# Patient Record
Sex: MALE | Race: WHITE | NOT HISPANIC OR LATINO | Employment: UNEMPLOYED | URBAN - METROPOLITAN AREA
[De-identification: names, ages, dates, MRNs, and addresses within clinical notes are randomized per-mention and may not be internally consistent; named-entity substitution may affect disease eponyms.]

---

## 2023-02-20 PROBLEM — R26.2 DIFFICULTY WALKING: Status: ACTIVE | Noted: 2023-02-20

## 2023-02-20 PROBLEM — K21.9 GERD WITHOUT ESOPHAGITIS: Status: ACTIVE | Noted: 2023-02-20

## 2023-02-20 PROBLEM — J18.9 PNEUMONIA: Status: ACTIVE | Noted: 2023-02-20

## 2023-02-20 PROBLEM — K76.0 HEPATIC STEATOSIS: Status: ACTIVE | Noted: 2023-02-20

## 2023-02-20 PROBLEM — I25.10 ARTERIOSCLEROTIC CORONARY ARTERY DISEASE: Status: ACTIVE | Noted: 2023-02-20

## 2023-02-20 PROBLEM — N28.89 RIGHT RENAL MASS: Status: ACTIVE | Noted: 2023-02-20

## 2023-02-20 PROBLEM — S41.119A SKIN TEAR OF UPPER EXTREMITY: Status: ACTIVE | Noted: 2023-02-20

## 2023-02-20 PROBLEM — H93.13 TINNITUS, BILATERAL: Status: ACTIVE | Noted: 2023-02-20

## 2023-02-20 PROBLEM — I50.30 (HFPEF) HEART FAILURE WITH PRESERVED EJECTION FRACTION (MULTI): Status: ACTIVE | Noted: 2023-02-20

## 2023-02-20 PROBLEM — K92.2 GI BLEED: Status: ACTIVE | Noted: 2023-02-20

## 2023-02-20 PROBLEM — J34.89 RHINORRHEA: Status: ACTIVE | Noted: 2023-02-20

## 2023-02-20 PROBLEM — R60.9 EDEMA: Status: ACTIVE | Noted: 2023-02-20

## 2023-02-20 PROBLEM — E03.2 HYPOTHYROIDISM DUE TO MEDICATION: Status: ACTIVE | Noted: 2023-02-20

## 2023-02-20 PROBLEM — K62.89 RECTAL PAIN: Status: ACTIVE | Noted: 2023-02-20

## 2023-02-20 PROBLEM — S80.10XA LEG HEMATOMA: Status: ACTIVE | Noted: 2023-02-20

## 2023-02-20 PROBLEM — E78.00 HYPERCHOLESTEROLEMIA: Status: ACTIVE | Noted: 2023-02-20

## 2023-02-20 PROBLEM — S22.39XA RIB FRACTURE: Status: ACTIVE | Noted: 2023-02-20

## 2023-02-20 PROBLEM — R26.81 UNSTEADINESS ON FEET: Status: ACTIVE | Noted: 2023-02-20

## 2023-02-20 PROBLEM — R26.89 BALANCE PROBLEM: Status: ACTIVE | Noted: 2023-02-20

## 2023-02-20 PROBLEM — G47.00 INSOMNIA: Status: ACTIVE | Noted: 2023-02-20

## 2023-02-20 PROBLEM — E11.9 DIABETES MELLITUS (MULTI): Status: ACTIVE | Noted: 2023-02-20

## 2023-02-20 PROBLEM — R06.02 SHORTNESS OF BREATH: Status: ACTIVE | Noted: 2023-02-20

## 2023-02-20 PROBLEM — T38.0X5A STEROID MYOPATHY: Status: ACTIVE | Noted: 2023-02-20

## 2023-02-20 PROBLEM — R79.89 ABNORMAL LFTS: Status: ACTIVE | Noted: 2023-02-20

## 2023-02-20 PROBLEM — Z04.9 CONDITION NOT FOUND: Status: ACTIVE | Noted: 2023-02-20

## 2023-02-20 PROBLEM — M65.88 RS3PE SYNDROME (REMITTING SERONEGATIVE SYMMETRICAL SYNOVITIS WITH PITTING EDEMA): Status: ACTIVE | Noted: 2023-02-20

## 2023-02-20 PROBLEM — R53.1 WEAKNESS: Status: ACTIVE | Noted: 2023-02-20

## 2023-02-20 PROBLEM — R93.89 ABNORMAL CT SCAN, CHEST: Status: ACTIVE | Noted: 2023-02-20

## 2023-02-20 PROBLEM — I48.91 ATRIAL FIBRILLATION (MULTI): Status: ACTIVE | Noted: 2023-02-20

## 2023-02-20 PROBLEM — R50.9 FEVER: Status: ACTIVE | Noted: 2023-02-20

## 2023-02-20 PROBLEM — M25.559 HIP PAIN: Status: ACTIVE | Noted: 2023-02-20

## 2023-02-20 PROBLEM — T14.8XXA NONHEALING NONSURGICAL WOUND: Status: ACTIVE | Noted: 2023-02-20

## 2023-02-20 PROBLEM — E87.6 HYPOKALEMIA: Status: ACTIVE | Noted: 2023-02-20

## 2023-02-20 PROBLEM — R10.9 ABDOMINAL CRAMPING: Status: ACTIVE | Noted: 2023-02-20

## 2023-02-20 PROBLEM — G62.9 PERIPHERAL NEUROPATHY: Status: ACTIVE | Noted: 2023-02-20

## 2023-02-20 PROBLEM — I10 HYPERTENSION: Status: ACTIVE | Noted: 2023-02-20

## 2023-02-20 PROBLEM — R60.9 RS3PE SYNDROME (REMITTING SERONEGATIVE SYMMETRICAL SYNOVITIS WITH PITTING EDEMA): Status: ACTIVE | Noted: 2023-02-20

## 2023-02-20 PROBLEM — D64.9 ANEMIA: Status: ACTIVE | Noted: 2023-02-20

## 2023-02-20 PROBLEM — C34.90 LUNG CANCER (MULTI): Status: ACTIVE | Noted: 2023-02-20

## 2023-02-20 PROBLEM — R60.9 PERIPHERAL EDEMA: Status: ACTIVE | Noted: 2023-02-20

## 2023-02-20 PROBLEM — F32.A DEPRESSION: Status: ACTIVE | Noted: 2023-02-20

## 2023-02-20 PROBLEM — S81.011D KNEE LACERATION, RIGHT, SUBSEQUENT ENCOUNTER: Status: ACTIVE | Noted: 2023-02-20

## 2023-02-20 PROBLEM — G56.00 CARPAL TUNNEL SYNDROME: Status: ACTIVE | Noted: 2023-02-20

## 2023-02-20 PROBLEM — N39.0 UTI (URINARY TRACT INFECTION): Status: ACTIVE | Noted: 2023-02-20

## 2023-02-20 PROBLEM — N13.30 HYDRONEPHROSIS, LEFT: Status: ACTIVE | Noted: 2023-02-20

## 2023-02-20 PROBLEM — G72.0 STEROID MYOPATHY: Status: ACTIVE | Noted: 2023-02-20

## 2023-02-20 PROBLEM — S89.90XA KNEE INJURIES: Status: ACTIVE | Noted: 2023-02-20

## 2023-02-20 PROBLEM — I95.9 HYPOTENSION: Status: ACTIVE | Noted: 2023-02-20

## 2023-02-20 PROBLEM — D72.829 ELEVATED WHITE BLOOD CELL COUNT: Status: ACTIVE | Noted: 2023-02-20

## 2023-02-20 PROBLEM — S81.802A OPEN LEG WOUND, LEFT, INITIAL ENCOUNTER: Status: ACTIVE | Noted: 2023-02-20

## 2023-02-20 PROBLEM — M48.061 LUMBAR SPINAL STENOSIS: Status: ACTIVE | Noted: 2023-02-20

## 2023-02-20 RX ORDER — ESOMEPRAZOLE MAGNESIUM 40 MG/1
1 CAPSULE, DELAYED RELEASE ORAL 2 TIMES DAILY
COMMUNITY
Start: 2021-12-09 | End: 2023-04-19 | Stop reason: SDUPTHER

## 2023-02-20 RX ORDER — ACETAMINOPHEN 160 MG/5ML
2 SUSPENSION, ORAL (FINAL DOSE FORM) ORAL DAILY
COMMUNITY
Start: 2017-02-01

## 2023-02-20 RX ORDER — IPRATROPIUM BROMIDE 42 UG/1
2 SPRAY, METERED NASAL 4 TIMES DAILY PRN
COMMUNITY
Start: 2020-12-01 | End: 2023-09-13 | Stop reason: SDUPTHER

## 2023-02-20 RX ORDER — ATORVASTATIN CALCIUM 40 MG/1
1 TABLET, FILM COATED ORAL DAILY
COMMUNITY
Start: 2020-12-01

## 2023-02-20 RX ORDER — AMIODARONE HYDROCHLORIDE 100 MG/1
1 TABLET ORAL DAILY
COMMUNITY
Start: 2021-07-22 | End: 2023-08-01 | Stop reason: ALTCHOICE

## 2023-02-20 RX ORDER — DAPAGLIFLOZIN 10 MG/1
5 TABLET, FILM COATED ORAL DAILY
COMMUNITY

## 2023-02-20 RX ORDER — LEVOTHYROXINE SODIUM 75 UG/1
1 TABLET ORAL DAILY
COMMUNITY
Start: 2021-07-24

## 2023-02-20 RX ORDER — CHOLECALCIFEROL (VITAMIN D3) 50 MCG
2 TABLET ORAL DAILY
COMMUNITY
Start: 2020-07-29 | End: 2023-12-01 | Stop reason: SDUPTHER

## 2023-02-20 RX ORDER — FUROSEMIDE 40 MG/1
40 TABLET ORAL 2 TIMES DAILY
COMMUNITY
End: 2023-03-08 | Stop reason: SDUPTHER

## 2023-02-20 RX ORDER — DOXYCYCLINE 100 MG/1
100 TABLET ORAL EVERY 12 HOURS
COMMUNITY
End: 2023-04-19 | Stop reason: ALTCHOICE

## 2023-02-20 RX ORDER — POTASSIUM CHLORIDE 750 MG/1
1 TABLET, FILM COATED, EXTENDED RELEASE ORAL DAILY
COMMUNITY
Start: 2021-06-24 | End: 2023-03-08

## 2023-02-20 RX ORDER — PREDNISONE 1 MG/1
2 TABLET ORAL DAILY
COMMUNITY
Start: 2020-10-17 | End: 2024-01-03 | Stop reason: SDUPTHER

## 2023-02-20 RX ORDER — NITROFURANTOIN 25; 75 MG/1; MG/1
100 CAPSULE ORAL EVERY 12 HOURS SCHEDULED
COMMUNITY
End: 2023-08-01 | Stop reason: ALTCHOICE

## 2023-03-07 DIAGNOSIS — Z00.00 HEALTHCARE MAINTENANCE: ICD-10-CM

## 2023-03-07 DIAGNOSIS — E87.6 HYPOKALEMIA: ICD-10-CM

## 2023-03-08 RX ORDER — FUROSEMIDE 40 MG/1
40 TABLET ORAL 2 TIMES DAILY
Qty: 30 TABLET | Refills: 2 | Status: SHIPPED | OUTPATIENT
Start: 2023-03-08 | End: 2023-08-01 | Stop reason: SDUPTHER

## 2023-03-08 RX ORDER — POTASSIUM CHLORIDE 750 MG/1
TABLET, EXTENDED RELEASE ORAL
Qty: 30 TABLET | Refills: 0 | Status: SHIPPED | OUTPATIENT
Start: 2023-03-08 | End: 2023-04-04 | Stop reason: SDUPTHER

## 2023-03-09 LAB
ALBUMIN (G/DL) IN SER/PLAS: 4 G/DL (ref 3.4–5)
ANION GAP IN SER/PLAS: 15 MMOL/L (ref 10–20)
CALCIUM (MG/DL) IN SER/PLAS: 9.1 MG/DL (ref 8.6–10.6)
CARBON DIOXIDE, TOTAL (MMOL/L) IN SER/PLAS: 29 MMOL/L (ref 21–32)
CHLORIDE (MMOL/L) IN SER/PLAS: 95 MMOL/L (ref 98–107)
CREATININE (MG/DL) IN SER/PLAS: 1.88 MG/DL (ref 0.5–1.3)
GFR MALE: 34 ML/MIN/1.73M2
GLUCOSE (MG/DL) IN SER/PLAS: 326 MG/DL (ref 74–99)
NATRIURETIC PEPTIDE B (PG/ML) IN SER/PLAS: 199 PG/ML (ref 0–99)
PHOSPHATE (MG/DL) IN SER/PLAS: 3.6 MG/DL (ref 2.5–4.9)
POTASSIUM (MMOL/L) IN SER/PLAS: 4.3 MMOL/L (ref 3.5–5.3)
SODIUM (MMOL/L) IN SER/PLAS: 135 MMOL/L (ref 136–145)
UREA NITROGEN (MG/DL) IN SER/PLAS: 39 MG/DL (ref 6–23)

## 2023-03-18 LAB
ANION GAP IN SER/PLAS: 14 MMOL/L (ref 10–20)
CALCIUM (MG/DL) IN SER/PLAS: 9.3 MG/DL (ref 8.6–10.6)
CARBON DIOXIDE, TOTAL (MMOL/L) IN SER/PLAS: 29 MMOL/L (ref 21–32)
CHLORIDE (MMOL/L) IN SER/PLAS: 101 MMOL/L (ref 98–107)
CREATININE (MG/DL) IN SER/PLAS: 1.65 MG/DL (ref 0.5–1.3)
ESTIMATED AVERAGE GLUCOSE FOR HBA1C: 237 MG/DL
GFR MALE: 40 ML/MIN/1.73M2
GLUCOSE (MG/DL) IN SER/PLAS: 222 MG/DL (ref 74–99)
HEMOGLOBIN A1C/HEMOGLOBIN TOTAL IN BLOOD: 9.9 %
NATRIURETIC PEPTIDE B (PG/ML) IN SER/PLAS: 236 PG/ML (ref 0–99)
POTASSIUM (MMOL/L) IN SER/PLAS: 4.4 MMOL/L (ref 3.5–5.3)
SODIUM (MMOL/L) IN SER/PLAS: 140 MMOL/L (ref 136–145)
UREA NITROGEN (MG/DL) IN SER/PLAS: 30 MG/DL (ref 6–23)

## 2023-04-04 DIAGNOSIS — E87.6 HYPOKALEMIA: ICD-10-CM

## 2023-04-04 RX ORDER — POTASSIUM CHLORIDE 750 MG/1
10 TABLET, FILM COATED, EXTENDED RELEASE ORAL DAILY
Qty: 30 TABLET | Refills: 1 | Status: SHIPPED | OUTPATIENT
Start: 2023-04-04 | End: 2023-08-01 | Stop reason: ALTCHOICE

## 2023-04-19 ENCOUNTER — OFFICE VISIT (OUTPATIENT)
Dept: PRIMARY CARE | Facility: CLINIC | Age: 88
End: 2023-04-19
Payer: MEDICARE

## 2023-04-19 VITALS
DIASTOLIC BLOOD PRESSURE: 84 MMHG | WEIGHT: 210 LBS | HEIGHT: 72 IN | SYSTOLIC BLOOD PRESSURE: 128 MMHG | BODY MASS INDEX: 28.44 KG/M2 | HEART RATE: 58 BPM

## 2023-04-19 DIAGNOSIS — E11.9 TYPE 2 DIABETES MELLITUS WITHOUT COMPLICATION, WITHOUT LONG-TERM CURRENT USE OF INSULIN (MULTI): Primary | ICD-10-CM

## 2023-04-19 DIAGNOSIS — C34.90 MALIGNANT NEOPLASM OF LUNG, UNSPECIFIED LATERALITY, UNSPECIFIED PART OF LUNG (MULTI): ICD-10-CM

## 2023-04-19 DIAGNOSIS — I10 PRIMARY HYPERTENSION: ICD-10-CM

## 2023-04-19 DIAGNOSIS — K21.9 GERD WITHOUT ESOPHAGITIS: ICD-10-CM

## 2023-04-19 DIAGNOSIS — L30.9 DERMATITIS: ICD-10-CM

## 2023-04-19 DIAGNOSIS — R60.9 PERIPHERAL EDEMA: ICD-10-CM

## 2023-04-19 PROBLEM — K92.2 GI BLEED: Status: RESOLVED | Noted: 2023-02-20 | Resolved: 2023-04-19

## 2023-04-19 PROCEDURE — 3074F SYST BP LT 130 MM HG: CPT | Performed by: INTERNAL MEDICINE

## 2023-04-19 PROCEDURE — 1159F MED LIST DOCD IN RCRD: CPT | Performed by: INTERNAL MEDICINE

## 2023-04-19 PROCEDURE — 3079F DIAST BP 80-89 MM HG: CPT | Performed by: INTERNAL MEDICINE

## 2023-04-19 PROCEDURE — 1036F TOBACCO NON-USER: CPT | Performed by: INTERNAL MEDICINE

## 2023-04-19 PROCEDURE — 99213 OFFICE O/P EST LOW 20 MIN: CPT | Performed by: INTERNAL MEDICINE

## 2023-04-19 PROCEDURE — 1160F RVW MEDS BY RX/DR IN RCRD: CPT | Performed by: INTERNAL MEDICINE

## 2023-04-19 RX ORDER — ESOMEPRAZOLE MAGNESIUM 40 MG/1
40 CAPSULE, DELAYED RELEASE ORAL
Qty: 90 CAPSULE | Refills: 3 | Status: SHIPPED | OUTPATIENT
Start: 2023-04-19

## 2023-04-19 RX ORDER — TRIAMCINOLONE ACETONIDE 1 MG/G
OINTMENT TOPICAL 2 TIMES DAILY PRN
Qty: 60 G | Refills: 3 | Status: SHIPPED | OUTPATIENT
Start: 2023-04-19 | End: 2023-08-17

## 2023-04-19 RX ORDER — GUAIFENESIN 600 MG/1
600 TABLET, EXTENDED RELEASE ORAL DAILY PRN
Qty: 90 TABLET | Refills: 3 | Status: SHIPPED | OUTPATIENT
Start: 2023-04-19 | End: 2024-01-03 | Stop reason: WASHOUT

## 2023-04-19 RX ORDER — GLIPIZIDE 2.5 MG/1
2.5 TABLET, EXTENDED RELEASE ORAL
Qty: 30 TABLET | Refills: 11 | COMMUNITY
Start: 2023-03-31 | End: 2023-08-01 | Stop reason: ALTCHOICE

## 2023-04-19 ASSESSMENT — ENCOUNTER SYMPTOMS
PALPITATIONS: 0
POLYDIPSIA: 0
FEVER: 0
SHORTNESS OF BREATH: 0
CHILLS: 0
COUGH: 0

## 2023-04-19 ASSESSMENT — PAIN SCALES - GENERAL: PAINLEVEL: 0-NO PAIN

## 2023-04-19 NOTE — PROGRESS NOTES
Subjective   Patient ID: Arnaldo Neal is a 88 y.o. male who presents for Follow-up.    88-year-old male with previously uncontrolled peripheral edema presents for follow-up onset edema.  In the interval of visits medications have been adjusted by cardiology and edema is dramatically lower.  Additionally he had an A1c checked after a single blood sugar came back over 300 which showed 9.9 and adjustments have been made to his medications by his endocrinologist.    Now on regular diuretic therapy and farxiga for edema and CHF. Much improved edema. 3+ to 1+.     A1C 9.9 recently. Significant changes already instituted by pt's established Endo. Now on glipizide 2.5mg daily and farxiga 5mg. Morning sugar  and PM now 120's.     Doing regular exercise with .     Hypoglycemia.  Does have orange juice in the house in case of event.  Follow-up with endocrinology currently scheduled by telemedicine coming up very soon.  No adjustments immediately required on evaluation today.         Review of Systems   Constitutional:  Negative for chills and fever.   Respiratory:  Negative for cough and shortness of breath.    Cardiovascular:  Negative for chest pain and palpitations.   Endocrine: Negative for polydipsia and polyuria.       Objective   /84 (BP Location: Left arm, Patient Position: Sitting, BP Cuff Size: Adult)   Pulse 58   Ht 1.829 m (6')   Wt 95.3 kg (210 lb)   BMI 28.48 kg/m²     Physical Exam  Constitutional:       Appearance: Normal appearance.   HENT:      Head: Normocephalic and atraumatic.   Eyes:      Extraocular Movements: Extraocular movements intact.      Pupils: Pupils are equal, round, and reactive to light.   Neck:      Thyroid: No thyroid mass or thyromegaly.      Vascular: No carotid bruit.   Cardiovascular:      Rate and Rhythm: Normal rate and regular rhythm.      Heart sounds: No murmur heard.     No friction rub. No gallop.   Pulmonary:      Effort: No respiratory distress.       Breath sounds: No wheezing, rhonchi or rales.   Musculoskeletal:      Cervical back: Neck supple.      Right lower leg: Edema (1+) present.      Left lower leg: Edema (1+) present.   Lymphadenopathy:      Cervical: No cervical adenopathy.   Neurological:      Mental Status: He is alert.         Assessment/Plan   Problem List Items Addressed This Visit          Respiratory    Lung cancer (CMS/HCC)    Relevant Medications    guaiFENesin (Mucinex) 600 mg 12 hr tablet       Circulatory    Hypertension       Digestive    GERD without esophagitis    Relevant Medications    esomeprazole (NexIUM) 40 mg DR capsule       Musculoskeletal    Peripheral edema       Endocrine/Metabolic    Diabetes mellitus (CMS/HCC) - Primary     Other Visit Diagnoses       Dermatitis        Relevant Medications    triamcinolone (Kenalog) 0.1 % ointment

## 2023-04-19 NOTE — PATIENT INSTRUCTIONS
3 mos follow up please    Continue current diuretic therapy without making adjustments.  Continue current diuretic therapy.  If sugars continue to be this controlled may want to consider stopping glipizide symptoms such a low dose and increasing the Farxiga to the full 10 mg daily but otherwise right now keep things the same await specialist input and follow-up as recommended.

## 2023-05-05 ENCOUNTER — TELEPHONE (OUTPATIENT)
Dept: PRIMARY CARE | Facility: CLINIC | Age: 88
End: 2023-05-05
Payer: COMMERCIAL

## 2023-05-05 NOTE — TELEPHONE ENCOUNTER
Patient has been feeling very sleepy, Narciso reilly said that things look good but the Pharmacist suggested looking at his thyroid levels. She would like to know if we knew his thyroid numbers from his previous labs and if it was added to the new lab request that was recently put in.

## 2023-05-15 LAB
ALBUMIN (G/DL) IN SER/PLAS: 3.6 G/DL (ref 3.4–5)
ANION GAP IN SER/PLAS: 13 MMOL/L (ref 10–20)
CALCIUM (MG/DL) IN SER/PLAS: 9.1 MG/DL (ref 8.6–10.6)
CARBON DIOXIDE, TOTAL (MMOL/L) IN SER/PLAS: 31 MMOL/L (ref 21–32)
CHLORIDE (MMOL/L) IN SER/PLAS: 101 MMOL/L (ref 98–107)
CREATININE (MG/DL) IN SER/PLAS: 1.78 MG/DL (ref 0.5–1.3)
GFR MALE: 36 ML/MIN/1.73M2
GLUCOSE (MG/DL) IN SER/PLAS: 177 MG/DL (ref 74–99)
NATRIURETIC PEPTIDE B (PG/ML) IN SER/PLAS: 305 PG/ML (ref 0–99)
PHOSPHATE (MG/DL) IN SER/PLAS: 3.9 MG/DL (ref 2.5–4.9)
POTASSIUM (MMOL/L) IN SER/PLAS: 5.1 MMOL/L (ref 3.5–5.3)
SODIUM (MMOL/L) IN SER/PLAS: 140 MMOL/L (ref 136–145)
UREA NITROGEN (MG/DL) IN SER/PLAS: 32 MG/DL (ref 6–23)

## 2023-07-13 LAB
ANION GAP IN SER/PLAS: 17 MMOL/L (ref 10–20)
CALCIUM (MG/DL) IN SER/PLAS: 9.8 MG/DL (ref 8.6–10.6)
CARBON DIOXIDE, TOTAL (MMOL/L) IN SER/PLAS: 28 MMOL/L (ref 21–32)
CHLORIDE (MMOL/L) IN SER/PLAS: 99 MMOL/L (ref 98–107)
CREATININE (MG/DL) IN SER/PLAS: 2.32 MG/DL (ref 0.5–1.3)
GFR MALE: 26 ML/MIN/1.73M2
GLUCOSE (MG/DL) IN SER/PLAS: 128 MG/DL (ref 74–99)
NATRIURETIC PEPTIDE B (PG/ML) IN SER/PLAS: 212 PG/ML (ref 0–99)
POTASSIUM (MMOL/L) IN SER/PLAS: 4.5 MMOL/L (ref 3.5–5.3)
SODIUM (MMOL/L) IN SER/PLAS: 139 MMOL/L (ref 136–145)
UREA NITROGEN (MG/DL) IN SER/PLAS: 36 MG/DL (ref 6–23)

## 2023-07-14 LAB
ESTIMATED AVERAGE GLUCOSE FOR HBA1C: 169 MG/DL
HEMOGLOBIN A1C/HEMOGLOBIN TOTAL IN BLOOD: 7.5 %

## 2023-07-28 LAB
ALBUMIN (G/DL) IN SER/PLAS: 3.8 G/DL (ref 3.4–5)
ANION GAP IN SER/PLAS: 16 MMOL/L (ref 10–20)
CALCIUM (MG/DL) IN SER/PLAS: 9.5 MG/DL (ref 8.6–10.6)
CARBON DIOXIDE, TOTAL (MMOL/L) IN SER/PLAS: 27 MMOL/L (ref 21–32)
CHLORIDE (MMOL/L) IN SER/PLAS: 101 MMOL/L (ref 98–107)
CREATININE (MG/DL) IN SER/PLAS: 1.89 MG/DL (ref 0.5–1.3)
GFR MALE: 34 ML/MIN/1.73M2
GLUCOSE (MG/DL) IN SER/PLAS: 162 MG/DL (ref 74–99)
NATRIURETIC PEPTIDE B (PG/ML) IN SER/PLAS: 302 PG/ML (ref 0–99)
PHOSPHATE (MG/DL) IN SER/PLAS: 4.2 MG/DL (ref 2.5–4.9)
POTASSIUM (MMOL/L) IN SER/PLAS: 5.1 MMOL/L (ref 3.5–5.3)
SODIUM (MMOL/L) IN SER/PLAS: 139 MMOL/L (ref 136–145)
UREA NITROGEN (MG/DL) IN SER/PLAS: 30 MG/DL (ref 6–23)

## 2023-08-01 ENCOUNTER — TELEPHONE (OUTPATIENT)
Dept: PRIMARY CARE | Facility: CLINIC | Age: 88
End: 2023-08-01

## 2023-08-01 ENCOUNTER — OFFICE VISIT (OUTPATIENT)
Dept: PRIMARY CARE | Facility: CLINIC | Age: 88
End: 2023-08-01
Payer: MEDICARE

## 2023-08-01 VITALS — TEMPERATURE: 97.3 F | SYSTOLIC BLOOD PRESSURE: 99 MMHG | HEART RATE: 53 BPM | DIASTOLIC BLOOD PRESSURE: 66 MMHG

## 2023-08-01 DIAGNOSIS — R26.89 BALANCE DISORDER: ICD-10-CM

## 2023-08-01 DIAGNOSIS — R26.81 GAIT INSTABILITY: Primary | ICD-10-CM

## 2023-08-01 DIAGNOSIS — Z00.00 HEALTHCARE MAINTENANCE: ICD-10-CM

## 2023-08-01 DIAGNOSIS — M62.81 MUSCULAR WEAKNESS: ICD-10-CM

## 2023-08-01 PROBLEM — A41.9 SEPSIS (MULTI): Status: ACTIVE | Noted: 2021-04-13

## 2023-08-01 PROBLEM — R53.81 DEBILITY: Status: ACTIVE | Noted: 2021-05-06

## 2023-08-01 PROCEDURE — 1036F TOBACCO NON-USER: CPT | Performed by: INTERNAL MEDICINE

## 2023-08-01 PROCEDURE — 3074F SYST BP LT 130 MM HG: CPT | Performed by: INTERNAL MEDICINE

## 2023-08-01 PROCEDURE — 1159F MED LIST DOCD IN RCRD: CPT | Performed by: INTERNAL MEDICINE

## 2023-08-01 PROCEDURE — 3078F DIAST BP <80 MM HG: CPT | Performed by: INTERNAL MEDICINE

## 2023-08-01 PROCEDURE — 1126F AMNT PAIN NOTED NONE PRSNT: CPT | Performed by: INTERNAL MEDICINE

## 2023-08-01 PROCEDURE — 1160F RVW MEDS BY RX/DR IN RCRD: CPT | Performed by: INTERNAL MEDICINE

## 2023-08-01 PROCEDURE — 99213 OFFICE O/P EST LOW 20 MIN: CPT | Performed by: INTERNAL MEDICINE

## 2023-08-01 RX ORDER — ALBUTEROL SULFATE 0.83 MG/ML
2.5 SOLUTION RESPIRATORY (INHALATION) 2 TIMES DAILY
COMMUNITY
Start: 2023-05-12 | End: 2024-01-08 | Stop reason: SDUPTHER

## 2023-08-01 RX ORDER — MOMETASONE FUROATE AND FORMOTEROL FUMARATE DIHYDRATE 100; 5 UG/1; UG/1
AEROSOL RESPIRATORY (INHALATION)
COMMUNITY

## 2023-08-01 RX ORDER — BLOOD-GLUCOSE METER
EACH MISCELLANEOUS
COMMUNITY
Start: 2023-07-09

## 2023-08-01 RX ORDER — FUROSEMIDE 40 MG/1
20 TABLET ORAL DAILY
Qty: 30 TABLET | Refills: 2
Start: 2023-08-01 | End: 2023-10-12 | Stop reason: DRUGHIGH

## 2023-08-01 RX ORDER — SPIRONOLACTONE 25 MG/1
12.5 TABLET ORAL DAILY
COMMUNITY
End: 2024-01-03 | Stop reason: WASHOUT

## 2023-08-01 RX ORDER — FUROSEMIDE 40 MG/1
40 TABLET ORAL DAILY
Qty: 30 TABLET | Refills: 2
Start: 2023-08-01 | End: 2023-08-01 | Stop reason: SDUPTHER

## 2023-08-01 RX ORDER — LANCETS 33 GAUGE
EACH MISCELLANEOUS
COMMUNITY
Start: 2023-07-09

## 2023-08-01 RX ORDER — INSULIN LISPRO 100 [IU]/ML
INJECTION, SOLUTION INTRAVENOUS; SUBCUTANEOUS
COMMUNITY
End: 2023-12-04 | Stop reason: ALTCHOICE

## 2023-08-01 RX ORDER — METOPROLOL SUCCINATE 50 MG/1
50 TABLET, EXTENDED RELEASE ORAL DAILY
COMMUNITY
End: 2023-10-27 | Stop reason: SDUPTHER

## 2023-08-01 ASSESSMENT — ENCOUNTER SYMPTOMS
PALPITATIONS: 0
CHILLS: 0
POLYDIPSIA: 0
FEVER: 0
COUGH: 0
SHORTNESS OF BREATH: 0

## 2023-08-01 NOTE — PATIENT INSTRUCTIONS
Please work to be careful with salt. Goal 2000mg total per day or less. Extra salt means extra swelling.

## 2023-08-01 NOTE — PROGRESS NOTES
Subjective   Patient ID: Arnaldo Neal is a 88 y.o. male who presents for Follow-up.    PT presents for med follow up. KAYLENE from prerenal azotemia 2/2 meds. Rx list updated.     PT counseled about inhaler use and med list. Demo provided.     Pt counseled about edema management, water intake, salt intake, and limits. Advised on compression stockings and leg elevation again.     A1c much improved through diet management in last 3 months. Back in 7's range again    Renal health more stable. Pt direct educated about med limits and risk for long term kidney damage if we need to raise meds long term due to no-adherance to diet/lifestyle measures.     Wheel chair bound. PT advised due to chronic weakness and fall risk.              Review of Systems   Constitutional:  Negative for chills and fever.   Respiratory:  Negative for cough and shortness of breath.    Cardiovascular:  Negative for chest pain and palpitations.   Endocrine: Negative for polydipsia and polyuria.       Objective   BP 99/66 (Patient Position: Sitting)   Pulse 53   Temp 36.3 °C (97.3 °F) (Temporal)     Physical Exam  Constitutional:       Appearance: Normal appearance.   HENT:      Head: Normocephalic and atraumatic.   Eyes:      Extraocular Movements: Extraocular movements intact.      Pupils: Pupils are equal, round, and reactive to light.   Neck:      Thyroid: No thyroid mass or thyromegaly.      Vascular: No carotid bruit.   Cardiovascular:      Rate and Rhythm: Normal rate and regular rhythm.      Heart sounds: No murmur heard.     No friction rub. No gallop.   Pulmonary:      Effort: No respiratory distress.      Breath sounds: No wheezing, rhonchi or rales.   Musculoskeletal:      Cervical back: Neck supple.      Right lower leg: Edema present.      Left lower leg: Edema present.   Lymphadenopathy:      Cervical: No cervical adenopathy.   Neurological:      Mental Status: He is alert.         Assessment/Plan   Problem List Items Addressed This  Visit    None  Visit Diagnoses       Gait instability    -  Primary    Relevant Orders    Referral to Physical Therapy    Healthcare maintenance        Relevant Medications    furosemide (Lasix) 40 mg tablet    Balance disorder        Relevant Orders    Referral to Physical Therapy    Muscular weakness        Relevant Orders    Referral to Physical Therapy

## 2023-09-01 ENCOUNTER — TELEPHONE (OUTPATIENT)
Dept: PRIMARY CARE | Facility: CLINIC | Age: 88
End: 2023-09-01
Payer: COMMERCIAL

## 2023-09-01 NOTE — TELEPHONE ENCOUNTER
Patient's wife states that Arnaldo needs  rx for home care she states that it has to [facility] on the prescription faxed over to [facility] [phone]

## 2023-09-06 DIAGNOSIS — R26.89 BALANCE PROBLEM: ICD-10-CM

## 2023-09-06 DIAGNOSIS — I50.32 CHRONIC HEART FAILURE WITH PRESERVED EJECTION FRACTION (MULTI): Primary | ICD-10-CM

## 2023-09-06 NOTE — TELEPHONE ENCOUNTER
Patient's wife states that Arnaldo needs  rx for home care she states that it has to [facility] on the prescription faxed over to [facility] [phone]

## 2023-09-07 PROBLEM — J47.9 BRONCHIECTASIS (MULTI): Status: ACTIVE | Noted: 2023-05-12

## 2023-09-07 PROBLEM — R74.8 ELEVATED ALKALINE PHOSPHATASE LEVEL: Status: ACTIVE | Noted: 2020-12-04

## 2023-09-07 PROBLEM — E44.1 MALNUTRITION OF MILD DEGREE (MULTI): Status: ACTIVE | Noted: 2020-09-28

## 2023-09-07 PROBLEM — E66.9 OBESITY, CLASS I, BMI 30-34.9: Status: ACTIVE | Noted: 2018-07-29

## 2023-09-07 PROBLEM — C18.9 COLON CANCER (MULTI): Status: ACTIVE | Noted: 2023-09-07

## 2023-09-07 PROBLEM — J44.9 COPD (CHRONIC OBSTRUCTIVE PULMONARY DISEASE) (MULTI): Status: ACTIVE | Noted: 2023-03-13

## 2023-09-07 PROBLEM — R31.9 HEMATURIA OF UNKNOWN CAUSE: Status: ACTIVE | Noted: 2021-06-07

## 2023-09-07 PROBLEM — E43 SEVERE PROTEIN-CALORIE MALNUTRITION (MULTI): Status: ACTIVE | Noted: 2021-03-27

## 2023-09-07 RX ORDER — ECONAZOLE NITRATE 10 MG/G
1 CREAM TOPICAL DAILY
COMMUNITY
End: 2023-12-04 | Stop reason: ALTCHOICE

## 2023-09-07 RX ORDER — FINASTERIDE 5 MG/1
1 TABLET, FILM COATED ORAL DAILY
COMMUNITY
End: 2023-12-04 | Stop reason: ALTCHOICE

## 2023-09-07 RX ORDER — AMIODARONE HYDROCHLORIDE 200 MG/1
1 TABLET ORAL DAILY
COMMUNITY
End: 2023-10-12 | Stop reason: ALTCHOICE

## 2023-09-07 RX ORDER — CLINDAMYCIN PHOSPHATE 10 MG/G
1 GEL TOPICAL
COMMUNITY
End: 2023-12-04 | Stop reason: ALTCHOICE

## 2023-09-07 RX ORDER — BACITRACIN ZINC 500 UNIT/G
2-3 OINTMENT (GRAM) TOPICAL DAILY PRN
COMMUNITY
End: 2023-12-04 | Stop reason: ALTCHOICE

## 2023-09-07 RX ORDER — GLIPIZIDE 2.5 MG/1
2.5 TABLET, EXTENDED RELEASE ORAL DAILY
COMMUNITY
End: 2023-10-12 | Stop reason: ALTCHOICE

## 2023-09-07 RX ORDER — METFORMIN HYDROCHLORIDE 500 MG/1
1 TABLET ORAL
COMMUNITY
End: 2023-10-12 | Stop reason: ALTCHOICE

## 2023-09-07 RX ORDER — DIGOXIN 125 MCG
1 TABLET ORAL DAILY
COMMUNITY
End: 2023-10-12 | Stop reason: ALTCHOICE

## 2023-09-07 RX ORDER — BACLOFEN 10 MG/1
1 TABLET ORAL 3 TIMES DAILY PRN
COMMUNITY
End: 2023-12-04 | Stop reason: ALTCHOICE

## 2023-09-07 RX ORDER — TRIAMCINOLONE ACETONIDE 1 MG/G
1 OINTMENT TOPICAL 2 TIMES DAILY PRN
COMMUNITY
End: 2024-01-08 | Stop reason: ALTCHOICE

## 2023-09-13 DIAGNOSIS — J34.89 RHINORRHEA: Primary | ICD-10-CM

## 2023-09-13 RX ORDER — IPRATROPIUM BROMIDE 42 UG/1
2 SPRAY, METERED NASAL 4 TIMES DAILY PRN
Qty: 15 ML | Refills: 3 | Status: SHIPPED | OUTPATIENT
Start: 2023-09-13 | End: 2023-11-06 | Stop reason: SDUPTHER

## 2023-10-05 ENCOUNTER — OFFICE VISIT (OUTPATIENT)
Dept: OTOLARYNGOLOGY | Facility: CLINIC | Age: 88
End: 2023-10-05
Payer: MEDICARE

## 2023-10-05 VITALS — HEIGHT: 72 IN | BODY MASS INDEX: 28.48 KG/M2 | TEMPERATURE: 97.6 F

## 2023-10-05 DIAGNOSIS — H61.23 BILATERAL IMPACTED CERUMEN: Primary | ICD-10-CM

## 2023-10-05 DIAGNOSIS — H73.12 CHRONIC MYRINGITIS OF LEFT EAR: ICD-10-CM

## 2023-10-05 PROCEDURE — 1126F AMNT PAIN NOTED NONE PRSNT: CPT | Performed by: NURSE PRACTITIONER

## 2023-10-05 PROCEDURE — 1036F TOBACCO NON-USER: CPT | Performed by: NURSE PRACTITIONER

## 2023-10-05 PROCEDURE — 69210 REMOVE IMPACTED EAR WAX UNI: CPT | Performed by: NURSE PRACTITIONER

## 2023-10-05 PROCEDURE — 99202 OFFICE O/P NEW SF 15 MIN: CPT | Performed by: NURSE PRACTITIONER

## 2023-10-05 PROCEDURE — 1160F RVW MEDS BY RX/DR IN RCRD: CPT | Performed by: NURSE PRACTITIONER

## 2023-10-05 PROCEDURE — 1159F MED LIST DOCD IN RCRD: CPT | Performed by: NURSE PRACTITIONER

## 2023-10-05 ASSESSMENT — PATIENT HEALTH QUESTIONNAIRE - PHQ9
2. FEELING DOWN, DEPRESSED OR HOPELESS: NOT AT ALL
SUM OF ALL RESPONSES TO PHQ9 QUESTIONS 1 AND 2: 0
1. LITTLE INTEREST OR PLEASURE IN DOING THINGS: NOT AT ALL

## 2023-10-05 NOTE — PATIENT INSTRUCTIONS
PATIENT INSTRUCTIONS FOR EAR WAX BUILDUP (A.K.A. CERUMEN)    To clean the ears, wash the external ear with a cloth, but do not insert anything into the ear canal.  Most cases of ear wax blockage respond to home treatments used to soften wax.   You may try placing a few drops of mineral oil or baby oil once a week to help keep the wax soft. We do NOT recommend placing ANYTHING in the ear canal. Doing so may cause wax to be pushed back into the ear canal and stuck. It also risks injury to the ear canal and ear drum. We recommend avoiding using cotton tipped applicators, tissues, paper, or any rigid object in the ear canal.   Some people require regular cleanings every year or so to keep the ear canals open.

## 2023-10-05 NOTE — PROGRESS NOTES
Patient ID: Arnaldo Neal is a 88 y.o. male.    ProceduresSubjective     Cerumen removal    Consent:  The planned procedure is discussed including possible risk, benefits and alternative treatments reviewed.  Verbal consent is obtained.    Indications:Obstructed cerumen is noted affecting hearing and causing discomfort.    Procedure: The ears are examined microscopically.  Using speculum, alligator, #7, #5 suction the obstructive cerumen in both the ears were removed.    Findings: Cerumen and epithelial debris obstruction in both external auditory canals.  Inspection of tympanic membrane after cleaning showed intact with no effusion, retraction or perforation. There is evidence of ear canal erythema and TM redness without effusion left ear.     Clotrimazole-betamethasone  cream applied to left ear.    Post procedure: The patient tolerated the procedure well without complications   Patient ID: Arnaldo Neal is a 88 y.o. male who presents for cerumen impaction.  HPI    Patient is accompanied by family member. Patient has no complaints of ear pain or drainage. 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 He states that he is able to hear ok. No prior history of ear surgery or trauma to the ears. No complaints of dizziness or vertigo. He is scheduled for hearing test  next week.     Review of Systems    EF ROS   All other systems have been reviewed and are negative for complaints except for those mentioned in history of present illness, past medical history and problem list              Objective   Physical Exam    CONSTITUTIONAL: No acute distress, normal facial features; No fever; no chills  VOICE: No hoarseness or other audible abnormality  RESPIRATION: Breathing comfortably, no stridor; normal breathing effort  CV: No cyanosis visible on the face and neck area  EYES:Pupils equal and round ; no erythema; conjunctiva clear; sclera white  NEURO: Alert and oriented, able to raise eyebrows symmetrical bilateral, smile with no facial  droop, able to swallow  HEAD AND FACE: Symmetric facial features, no masses or lesions    Right ear examination: External ear normal. EAC with impacted cerumen. TM not visualized.  Left ear examination: External ear normal. EAC with impacted cerumen. TM not visualized    NOSE: External nose midline  ORAL CAVITY: No lesions of external lips; uvula is midline; tongue with good mobility; no gross mass in oral cavity; mucosa appears pink   NECK/LYMPH: No obvious deformity or lesions; trachea is midline  PSYCH: Alert and oriented with appropriate mood and affect   Assessment/Plan     Problem List Items Addressed This Visit    None  Visit Diagnoses       Bilateral impacted cerumen    -  Primary    Chronic myringitis of left ear                This patient presents for evaluation of cerumen impaction. The ear/s cleaned using microscope and instruments.  Patient tolerated the procedure well. Inspection of TM after cleaning showed intact and WNL.  Patient was provided instructions on ear care for cerumen in the discussion summary. Patient may follow up as needed for repeat cleaning or for all other ENT concerns.  All questions were answered to patient's satisfaction.

## 2023-10-06 ENCOUNTER — LAB (OUTPATIENT)
Dept: LAB | Facility: LAB | Age: 88
End: 2023-10-06
Payer: MEDICARE

## 2023-10-06 DIAGNOSIS — I50.20 UNSPECIFIED SYSTOLIC (CONGESTIVE) HEART FAILURE (MULTI): Primary | ICD-10-CM

## 2023-10-06 LAB
ALBUMIN SERPL BCP-MCNC: 3.7 G/DL (ref 3.4–5)
ANION GAP SERPL CALC-SCNC: 16 MMOL/L (ref 10–20)
BNP SERPL-MCNC: 451 PG/ML (ref 0–99)
BUN SERPL-MCNC: 29 MG/DL (ref 6–23)
CALCIUM SERPL-MCNC: 9.2 MG/DL (ref 8.6–10.6)
CHLORIDE SERPL-SCNC: 102 MMOL/L (ref 98–107)
CO2 SERPL-SCNC: 25 MMOL/L (ref 21–32)
CREAT SERPL-MCNC: 1.71 MG/DL (ref 0.5–1.3)
GFR SERPL CREATININE-BSD FRML MDRD: 38 ML/MIN/1.73M*2
GLUCOSE SERPL-MCNC: 127 MG/DL (ref 74–99)
PHOSPHATE SERPL-MCNC: 3.5 MG/DL (ref 2.5–4.9)
POTASSIUM SERPL-SCNC: 4.7 MMOL/L (ref 3.5–5.3)
SODIUM SERPL-SCNC: 138 MMOL/L (ref 136–145)

## 2023-10-06 PROCEDURE — 36415 COLL VENOUS BLD VENIPUNCTURE: CPT

## 2023-10-06 PROCEDURE — 80069 RENAL FUNCTION PANEL: CPT

## 2023-10-06 PROCEDURE — 83880 ASSAY OF NATRIURETIC PEPTIDE: CPT

## 2023-10-07 ENCOUNTER — TELEPHONE (OUTPATIENT)
Dept: CARDIOLOGY | Facility: CLINIC | Age: 88
End: 2023-10-07
Payer: COMMERCIAL

## 2023-10-09 NOTE — TELEPHONE ENCOUNTER
Result Communication    Resulted Orders   B-Type Natriuretic Peptide   Result Value Ref Range     (H) 0 - 99 pg/mL    Narrative       <100 pg/mL - Heart failure unlikely  100-299 pg/mL - Intermediate probability of acute heart                  failure exacerbation. Correlate with clinical                  context and patient history.    >=300 pg/mL - Heart Failure likely. Correlate with clinical                  context and patient history.     Biotin interference may cause falsely decreased results. Patients taking a Biotin dose of up to 5 mg/day should refrain from taking Biotin for 24 hours before sample  collection. Providers may contact their local laboratory for further information.   Renal Function Panel   Result Value Ref Range    Glucose 127 (H) 74 - 99 mg/dL    Sodium 138 136 - 145 mmol/L    Potassium 4.7 3.5 - 5.3 mmol/L    Chloride 102 98 - 107 mmol/L    Bicarbonate 25 21 - 32 mmol/L    Anion Gap 16 10 - 20 mmol/L    Urea Nitrogen 29 (H) 6 - 23 mg/dL    Creatinine 1.71 (H) 0.50 - 1.30 mg/dL    eGFR 38 (L) >60 mL/min/1.73m*2      Comment:      Calculations of estimated GFR are performed using the 2021 CKD-EPI Study Refit equation without the race variable for the IDMS-Traceable creatinine methods.  https://jasn.asnjournals.org/content/early/2021/09/22/ASN.5064316616    Calcium 9.2 8.6 - 10.6 mg/dL    Phosphorus 3.5 2.5 - 4.9 mg/dL      Comment:      The performance characteristics of phosphorus testing in heparinized plasma have been validated by the individual  laboratory site where testing is performed. Testing on heparinized plasma is not approved by the FDA; however, such approval is not necessary.    Albumin 3.7 3.4 - 5.0 g/dL       1:45 PM      Results were successfully communicated with the  spouse  and they acknowledged their understanding.

## 2023-10-11 PROBLEM — Z95.1 S/P CABG (CORONARY ARTERY BYPASS GRAFT): Status: ACTIVE | Noted: 2023-10-11

## 2023-10-12 ENCOUNTER — OFFICE VISIT (OUTPATIENT)
Dept: CARDIOLOGY | Facility: CLINIC | Age: 88
End: 2023-10-12
Payer: MEDICARE

## 2023-10-12 VITALS — SYSTOLIC BLOOD PRESSURE: 98 MMHG | DIASTOLIC BLOOD PRESSURE: 64 MMHG | HEART RATE: 98 BPM | OXYGEN SATURATION: 94 %

## 2023-10-12 DIAGNOSIS — Z00.00 HEALTHCARE MAINTENANCE: ICD-10-CM

## 2023-10-12 DIAGNOSIS — I50.32 CHRONIC HEART FAILURE WITH PRESERVED EJECTION FRACTION (MULTI): Primary | ICD-10-CM

## 2023-10-12 PROCEDURE — 3078F DIAST BP <80 MM HG: CPT | Performed by: INTERNAL MEDICINE

## 2023-10-12 PROCEDURE — 1036F TOBACCO NON-USER: CPT | Performed by: INTERNAL MEDICINE

## 2023-10-12 PROCEDURE — 1159F MED LIST DOCD IN RCRD: CPT | Performed by: INTERNAL MEDICINE

## 2023-10-12 PROCEDURE — 99215 OFFICE O/P EST HI 40 MIN: CPT | Performed by: INTERNAL MEDICINE

## 2023-10-12 PROCEDURE — 1160F RVW MEDS BY RX/DR IN RCRD: CPT | Performed by: INTERNAL MEDICINE

## 2023-10-12 PROCEDURE — 99215 OFFICE O/P EST HI 40 MIN: CPT | Mod: PO | Performed by: INTERNAL MEDICINE

## 2023-10-12 PROCEDURE — 1126F AMNT PAIN NOTED NONE PRSNT: CPT | Performed by: INTERNAL MEDICINE

## 2023-10-12 PROCEDURE — 3074F SYST BP LT 130 MM HG: CPT | Performed by: INTERNAL MEDICINE

## 2023-10-12 RX ORDER — SACUBITRIL AND VALSARTAN 24; 26 MG/1; MG/1
0.5 TABLET, FILM COATED ORAL 2 TIMES DAILY
Qty: 30 TABLET | Refills: 2 | Status: SHIPPED | OUTPATIENT
Start: 2023-10-12 | End: 2023-12-04 | Stop reason: SDUPTHER

## 2023-10-12 RX ORDER — FUROSEMIDE 40 MG/1
40 TABLET ORAL DAILY
Qty: 30 TABLET | Refills: 2 | Status: SHIPPED | OUTPATIENT
Start: 2023-10-12 | End: 2024-01-08 | Stop reason: DRUGHIGH

## 2023-10-12 ASSESSMENT — ENCOUNTER SYMPTOMS
OCCASIONAL FEELINGS OF UNSTEADINESS: 0
DEPRESSION: 0
LOSS OF SENSATION IN FEET: 0

## 2023-10-12 ASSESSMENT — PATIENT HEALTH QUESTIONNAIRE - PHQ9
2. FEELING DOWN, DEPRESSED OR HOPELESS: NOT AT ALL
1. LITTLE INTEREST OR PLEASURE IN DOING THINGS: NOT AT ALL
SUM OF ALL RESPONSES TO PHQ9 QUESTIONS 1 AND 2: 0

## 2023-10-12 ASSESSMENT — PAIN SCALES - GENERAL: PAINLEVEL: 0-NO PAIN

## 2023-10-12 NOTE — PROGRESS NOTES
Chief Complaint:   Heart Follow-up     History Of Present Illness:    Arnaldo Neal is a 88 y.o. male presenting with congestive heart failure.  The last evaluation.  In our office was in February, 2023.  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  Anticoagulation has been held.  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    At his last visit, he was on treatment with low-dose amiodarone, atorvastatin, furosemide 40 mg twice daily, potassium and prednisone.  We instituted treatment with Farxiga.  We arrange for an echocardiogram which was performed on March 3, 2023.  Ejection fraction was 40 to 45%, with mildly elevated RVSP, mild aortic valve stenosis, and mild global hypokinesis.  There was also mild RV dysfunction and RV dilatation.    The patient underwent right thoracentesis 3 weeks ago at [facility] for recurrent right pleural effusion, symptomatic.  No malignancy noted.  Repeat CT scan still shows a moderate-sized right pleural effusion, likely secondary to decompensated heart failure.  Last Recorded Vitals:  Vitals:    10/12/23 0928   BP: 98/64   BP Location: Left arm   Patient Position: Sitting   BP Cuff Size: Large adult   Pulse: 98   SpO2: 94%       Past Medical History:  He    Past Surgical History:  He      Social History:  He reports that he has quit smoking. His smoking use included cigarettes. He has never used smokeless tobacco. He reports current alcohol use of about 3.0 standard drinks of alcohol per week. He reports that he does not use drugs.    Family History:       Allergies:  Penicillin g, Penicillins, Pollen extracts, and Adhesive tape-silicones    Outpatient Medications:  Current  Outpatient Medications   Medication Instructions    albuterol 2.5 mg /3 mL (0.083 %) nebulizer solution inhalation    atorvastatin (Lipitor) 40 mg tablet 1 tablet, oral, Daily    bacitracin 500 unit/gram ointment 2-3 Applications, Topical, Daily PRN    baclofen (Lioresal) 10 mg tablet 1 tablet, oral, 3 times daily PRN    cholecalciferol (Vitamin D-3) 50 MCG (2000 UT) tablet 2 tablets, oral, Daily    clindamycin (Clindagel) 1 % gel 1 Application, Topical, During dressing changes    coenzyme Q-10 200 mg capsule 1 capsule, oral, 2 times daily    CRANBERRY ORAL 1 tablet, oral, Daily    dapagliflozin propanediol (FARXIGA) 5 mg, oral, Daily    Dulera 100-5 mcg/actuation inhaler INHALE TWO PUFFS BY MOUTH AS INSTRUCTED TWO TIMES A DAY    econazole nitrate 1 % cream 1 Application, Topical, Daily, On feet    esomeprazole (NEXIUM) 40 mg, oral, Daily before breakfast    finasteride (Proscar) 5 mg tablet 1 tablet, oral, Daily    furosemide (LASIX) 40 mg, oral, Daily    guaiFENesin (MUCINEX) 600 mg, oral, Daily PRN, Do not crush, chew, or split.    insulin lispro (HumaLOG U-100 Insulin) 100 unit/mL injection Humalog U-100 Insulin 100 unit/mL subcutaneous solution   INJECT 10 UNITS SUBCUTANEOUSLY THREE TIMES DAILY BEFORE MEALS.    ipratropium (Atrovent) 42 mcg (0.06 %) nasal spray 2 sprays, Each Nostril, 4 times daily PRN    levothyroxine (Synthroid, Levoxyl) 75 mcg tablet 1 tablet, oral, Daily    metoprolol succinate XL (TOPROL-XL) 50 mg, oral, Daily    OneTouch Delica Plus Lancet 30 gauge misc USE AS INSTRUCTED TO TEST BLOOD SUGAR TWICE DAILY    OneTouch Verio test strips strip use as instructed to test blood sugar twice a day    predniSONE (Deltasone) 1 mg tablet oral, 3 times daily, Take 3 tablets in the morning and 4 tablets in the evening.    sacubitriL-valsartan (Entresto) 24-26 mg tablet 0.5 tablets, oral, 2 times daily    spironolactone (ALDACTONE) 12.5 mg, oral, Daily    triamcinolone (Kenalog) 0.1 % ointment 1  "Application, Topical, 2 times daily PRN       Physical Exam:  The patient was a frail elderly man, sitting in a wheelchair.  Pertinent findings included the absence of breath sounds at the right base.  Heart sounds were regular.  The abdomen was nontender.  There was 2+ ankle edema.     Last Labs:  CBC -  Lab Results   Component Value Date    WBC 10.1 02/10/2023    HGB 14.0 02/10/2023    HCT 45.7 02/10/2023     02/10/2023     02/10/2023       CMP -  Lab Results   Component Value Date    CALCIUM 9.2 10/06/2023    PHOS 3.5 10/06/2023    PROT 6.2 (L) 02/10/2023    ALBUMIN 3.7 10/06/2023    AST 13 02/10/2023    ALT 14 02/10/2023    ALKPHOS 60 02/10/2023    BILITOT 1.3 (H) 02/10/2023       LIPID PANEL -   Lab Results   Component Value Date    CHOL 127 07/22/2021    TRIG 69 11/06/2018    HDL 48.5 11/06/2018    CHHDL 2.9 11/06/2018    LDLF 77 11/06/2018    VLDL 14 11/06/2018       RENAL FUNCTION PANEL -   Lab Results   Component Value Date    GLUCOSE 127 (H) 10/06/2023     10/06/2023    K 4.7 10/06/2023     10/06/2023    CO2 25 10/06/2023    ANIONGAP 16 10/06/2023    BUN 29 (H) 10/06/2023    CREATININE 1.71 (H) 10/06/2023    GFRMALE 34 (A) 07/28/2023    CALCIUM 9.2 10/06/2023    PHOS 3.5 10/06/2023    ALBUMIN 3.7 10/06/2023        Lab Results   Component Value Date     (H) 10/06/2023    HGBA1C 7.5 (A) 07/13/2023    HGBA1C 7.3 (H) 11/22/2022       Last Cardiology Tests:  ECG:  No results found for this or any previous visit from the past 1095 days.      Echo:  No results found for this or any previous visit from the past 1095 days.      Ejection Fractions:  No results found for: \"EF\"    Cath:  No results found for this or any previous visit from the past 1095 days.      Stress Test:  No results found for this or any previous visit from the past 1095 days.      Cardiac Imaging:  No results found for this or any previous visit from the past 1095 days.        Lab review: I have reviewed a " variety of recent laboratory tests.  Discussed above results with patient and patient's wife.    Assessment/Plan     The patient has an element of decompensated heart failure.  The BNP is moderately elevated.  He is already on a low-dose of spironolactone, metoprolol succinate and is tolerating low-dose of Farxiga, 5 mg daily.  His blood pressures are fairly low.  He will need to begin daily home blood pressure monitoring.  We are going to try Entresto at 1/2 tablet twice daily with careful monitoring.  We will check a BNP and renal function panel in 7 to 10 days.  We will schedule a 6-week follow-up office visit.    Rishabh Lewis MD

## 2023-10-12 NOTE — PATIENT INSTRUCTIONS
Begin Entresto 24-26 mg one half of the tablet twice daily.    Monitor blood pressure carefully.  Call us if blood pressures at home are consistently below 90/60.    In 1 week, please obtain blood tests.  These have been ordered, no need for fasting.    Continue all other medications.

## 2023-10-13 ENCOUNTER — TELEPHONE (OUTPATIENT)
Dept: CARDIOLOGY | Facility: CLINIC | Age: 88
End: 2023-10-13
Payer: COMMERCIAL

## 2023-10-13 DIAGNOSIS — I50.32 CHRONIC HEART FAILURE WITH PRESERVED EJECTION FRACTION (MULTI): Primary | ICD-10-CM

## 2023-10-27 RX ORDER — METOPROLOL SUCCINATE 50 MG/1
50 TABLET, EXTENDED RELEASE ORAL DAILY
Qty: 90 TABLET | Refills: 3 | Status: SHIPPED | OUTPATIENT
Start: 2023-10-27 | End: 2024-10-26

## 2023-11-02 ENCOUNTER — LAB (OUTPATIENT)
Dept: LAB | Facility: LAB | Age: 88
End: 2023-11-02
Payer: MEDICARE

## 2023-11-02 DIAGNOSIS — I50.32 CHRONIC HEART FAILURE WITH PRESERVED EJECTION FRACTION (MULTI): ICD-10-CM

## 2023-11-02 LAB
ALBUMIN SERPL BCP-MCNC: 3.6 G/DL (ref 3.4–5)
ANION GAP SERPL CALC-SCNC: 14 MMOL/L (ref 10–20)
BNP SERPL-MCNC: 263 PG/ML (ref 0–99)
BUN SERPL-MCNC: 35 MG/DL (ref 6–23)
CALCIUM SERPL-MCNC: 9.3 MG/DL (ref 8.6–10.6)
CHLORIDE SERPL-SCNC: 102 MMOL/L (ref 98–107)
CO2 SERPL-SCNC: 27 MMOL/L (ref 21–32)
CREAT SERPL-MCNC: 1.67 MG/DL (ref 0.5–1.3)
GFR SERPL CREATININE-BSD FRML MDRD: 39 ML/MIN/1.73M*2
GLUCOSE SERPL-MCNC: 125 MG/DL (ref 74–99)
PHOSPHATE SERPL-MCNC: 3.7 MG/DL (ref 2.5–4.9)
POTASSIUM SERPL-SCNC: 5.1 MMOL/L (ref 3.5–5.3)
SODIUM SERPL-SCNC: 138 MMOL/L (ref 136–145)

## 2023-11-02 PROCEDURE — 80069 RENAL FUNCTION PANEL: CPT

## 2023-11-02 PROCEDURE — 36415 COLL VENOUS BLD VENIPUNCTURE: CPT

## 2023-11-02 PROCEDURE — 83880 ASSAY OF NATRIURETIC PEPTIDE: CPT

## 2023-11-04 DIAGNOSIS — J34.89 RHINORRHEA: ICD-10-CM

## 2023-11-06 ENCOUNTER — TELEPHONE (OUTPATIENT)
Dept: CARDIOLOGY | Facility: CLINIC | Age: 88
End: 2023-11-06
Payer: COMMERCIAL

## 2023-11-06 ENCOUNTER — TELEPHONE (OUTPATIENT)
Dept: PRIMARY CARE | Facility: CLINIC | Age: 88
End: 2023-11-06
Payer: COMMERCIAL

## 2023-11-06 DIAGNOSIS — J34.89 RHINORRHEA: ICD-10-CM

## 2023-11-06 RX ORDER — IPRATROPIUM BROMIDE 42 UG/1
2 SPRAY, METERED NASAL 4 TIMES DAILY PRN
Qty: 15 ML | Refills: 3 | Status: SHIPPED | OUTPATIENT
Start: 2023-11-06 | End: 2023-11-07 | Stop reason: WASHOUT

## 2023-11-06 NOTE — TELEPHONE ENCOUNTER
----- Message from Rishabh Lewis MD sent at 11/2/2023  8:01 PM EDT -----  Lab work reviewed is satisfactory. Renal functiion is stable, and the BNP is a bit lower. Next visit, we will increase Entresto further.

## 2023-11-06 NOTE — TELEPHONE ENCOUNTER
Result Communication    Resulted Orders   B-Type Natriuretic Peptide   Result Value Ref Range     (H) 0 - 99 pg/mL    Narrative       <100 pg/mL - Heart failure unlikely  100-299 pg/mL - Intermediate probability of acute heart                  failure exacerbation. Correlate with clinical                  context and patient history.    >=300 pg/mL - Heart Failure likely. Correlate with clinical                  context and patient history.     Biotin interference may cause falsely decreased results. Patients taking a Biotin dose of up to 5 mg/day should refrain from taking Biotin for 24 hours before sample  collection. Providers may contact their local laboratory for further information.   Renal Function Panel   Result Value Ref Range    Glucose 125 (H) 74 - 99 mg/dL    Sodium 138 136 - 145 mmol/L    Potassium 5.1 3.5 - 5.3 mmol/L    Chloride 102 98 - 107 mmol/L    Bicarbonate 27 21 - 32 mmol/L    Anion Gap 14 10 - 20 mmol/L    Urea Nitrogen 35 (H) 6 - 23 mg/dL    Creatinine 1.67 (H) 0.50 - 1.30 mg/dL    eGFR 39 (L) >60 mL/min/1.73m*2      Comment:      Calculations of estimated GFR are performed using the 2021 CKD-EPI Study Refit equation without the race variable for the IDMS-Traceable creatinine methods.  https://jasn.asnjournals.org/content/early/2021/09/22/ASN.6929436142    Calcium 9.3 8.6 - 10.6 mg/dL    Phosphorus 3.7 2.5 - 4.9 mg/dL      Comment:      The performance characteristics of phosphorus testing in heparinized plasma have been validated by the individual  laboratory site where testing is performed. Testing on heparinized plasma is not approved by the FDA; however, such approval is not necessary.    Albumin 3.6 3.4 - 5.0 g/dL       9:44 AM      Results were successfully communicated with the patient and they acknowledged their understanding.

## 2023-11-07 RX ORDER — IPRATROPIUM BROMIDE 42 UG/1
SPRAY, METERED NASAL
Qty: 15 ML | Refills: 3 | Status: SHIPPED | OUTPATIENT
Start: 2023-11-07

## 2023-11-27 ENCOUNTER — APPOINTMENT (OUTPATIENT)
Dept: CARDIOLOGY | Facility: CLINIC | Age: 88
End: 2023-11-27
Payer: MEDICARE

## 2023-12-01 PROBLEM — J90 PLEURAL EFFUSION: Status: ACTIVE | Noted: 2023-09-27

## 2023-12-01 RX ORDER — ACETAMINOPHEN 500 MG
4000 TABLET ORAL DAILY
COMMUNITY
Start: 2023-09-15

## 2023-12-01 RX ORDER — MOMETASONE FUROATE AND FORMOTEROL FUMARATE DIHYDRATE 100; 5 UG/1; UG/1
2 AEROSOL RESPIRATORY (INHALATION) DAILY
COMMUNITY
Start: 2023-09-15

## 2023-12-01 RX ORDER — FUROSEMIDE 20 MG/1
40 TABLET ORAL DAILY
COMMUNITY
Start: 2023-09-27 | End: 2023-12-01 | Stop reason: SDUPTHER

## 2023-12-01 RX ORDER — ALBUTEROL SULFATE 90 UG/1
2 AEROSOL, METERED RESPIRATORY (INHALATION) EVERY 4 HOURS PRN
COMMUNITY
Start: 2023-10-20 | End: 2024-01-08 | Stop reason: ALTCHOICE

## 2023-12-01 RX ORDER — CIPROFLOXACIN 500 MG/1
500 TABLET ORAL 2 TIMES DAILY
COMMUNITY
Start: 2023-11-11 | End: 2023-11-21

## 2023-12-04 ENCOUNTER — OFFICE VISIT (OUTPATIENT)
Dept: CARDIOLOGY | Facility: CLINIC | Age: 88
End: 2023-12-04
Payer: MEDICARE

## 2023-12-04 VITALS
SYSTOLIC BLOOD PRESSURE: 132 MMHG | RESPIRATION RATE: 18 BRPM | OXYGEN SATURATION: 93 % | DIASTOLIC BLOOD PRESSURE: 70 MMHG | HEIGHT: 72 IN | HEART RATE: 101 BPM | BODY MASS INDEX: 28.48 KG/M2

## 2023-12-04 DIAGNOSIS — I10 PRIMARY HYPERTENSION: ICD-10-CM

## 2023-12-04 DIAGNOSIS — E78.00 HYPERCHOLESTEROLEMIA: ICD-10-CM

## 2023-12-04 DIAGNOSIS — I25.10 ARTERIOSCLEROTIC CORONARY ARTERY DISEASE: ICD-10-CM

## 2023-12-04 DIAGNOSIS — I50.32 CHRONIC HEART FAILURE WITH PRESERVED EJECTION FRACTION (MULTI): ICD-10-CM

## 2023-12-04 DIAGNOSIS — Z95.1 S/P CABG (CORONARY ARTERY BYPASS GRAFT): ICD-10-CM

## 2023-12-04 DIAGNOSIS — E11.9 TYPE 2 DIABETES MELLITUS WITHOUT COMPLICATION, WITHOUT LONG-TERM CURRENT USE OF INSULIN (MULTI): ICD-10-CM

## 2023-12-04 DIAGNOSIS — I48.91 ATRIAL FIBRILLATION, UNSPECIFIED TYPE (MULTI): Primary | ICD-10-CM

## 2023-12-04 PROCEDURE — 3075F SYST BP GE 130 - 139MM HG: CPT | Performed by: INTERNAL MEDICINE

## 2023-12-04 PROCEDURE — 99214 OFFICE O/P EST MOD 30 MIN: CPT | Performed by: INTERNAL MEDICINE

## 2023-12-04 PROCEDURE — 1036F TOBACCO NON-USER: CPT | Performed by: INTERNAL MEDICINE

## 2023-12-04 PROCEDURE — 1159F MED LIST DOCD IN RCRD: CPT | Performed by: INTERNAL MEDICINE

## 2023-12-04 PROCEDURE — 3078F DIAST BP <80 MM HG: CPT | Performed by: INTERNAL MEDICINE

## 2023-12-04 PROCEDURE — 1157F ADVNC CARE PLAN IN RCRD: CPT | Performed by: INTERNAL MEDICINE

## 2023-12-04 PROCEDURE — 1160F RVW MEDS BY RX/DR IN RCRD: CPT | Performed by: INTERNAL MEDICINE

## 2023-12-04 PROCEDURE — 1126F AMNT PAIN NOTED NONE PRSNT: CPT | Performed by: INTERNAL MEDICINE

## 2023-12-04 PROCEDURE — 99214 OFFICE O/P EST MOD 30 MIN: CPT | Mod: PO | Performed by: INTERNAL MEDICINE

## 2023-12-04 PROCEDURE — 93005 ELECTROCARDIOGRAM TRACING: CPT | Mod: PO | Performed by: INTERNAL MEDICINE

## 2023-12-04 PROCEDURE — 93010 ELECTROCARDIOGRAM REPORT: CPT | Performed by: INTERNAL MEDICINE

## 2023-12-04 RX ORDER — BISMUTH SUBSALICYLATE 262 MG
1 TABLET,CHEWABLE ORAL DAILY
COMMUNITY
End: 2024-01-08 | Stop reason: ALTCHOICE

## 2023-12-04 RX ORDER — SACUBITRIL AND VALSARTAN 24; 26 MG/1; MG/1
1 TABLET, FILM COATED ORAL 2 TIMES DAILY
Qty: 60 TABLET | Refills: 5 | Status: SHIPPED | OUTPATIENT
Start: 2023-12-04 | End: 2024-01-08 | Stop reason: DRUGHIGH

## 2023-12-04 ASSESSMENT — ENCOUNTER SYMPTOMS
OCCASIONAL FEELINGS OF UNSTEADINESS: 1
LOSS OF SENSATION IN FEET: 1
DEPRESSION: 0

## 2023-12-04 ASSESSMENT — PAIN SCALES - GENERAL: PAINLEVEL: 0-NO PAIN

## 2023-12-04 ASSESSMENT — COLUMBIA-SUICIDE SEVERITY RATING SCALE - C-SSRS
2. HAVE YOU ACTUALLY HAD ANY THOUGHTS OF KILLING YOURSELF?: NO
1. IN THE PAST MONTH, HAVE YOU WISHED YOU WERE DEAD OR WISHED YOU COULD GO TO SLEEP AND NOT WAKE UP?: NO
6. HAVE YOU EVER DONE ANYTHING, STARTED TO DO ANYTHING, OR PREPARED TO DO ANYTHING TO END YOUR LIFE?: NO

## 2023-12-04 NOTE — PROGRESS NOTES
Primary Care Physician: Joel Viramontes MD  Date of Visit: 12/04/2023  9:20 AM EST  [place] [facility] [place] [address]   Last office visit: 10/12/2023     Chief Complaint:     Atrial Fibrillation     HPI/Summary  Arnaldo Neal is a 88 y.o. male who presents for followup cardiology evaluation.     The patient was last evaluated 6 weeks ago.  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  An echocardiogram on March 3, 2023 showed ejection fraction of 40 to 45%, with mildly elevated RVSP, mild aortic stenosis, mild global hypokinesis.  Also, mild RV dysfunction and RV dilatation.  In September, 2023, he underwent right thoracentesis at [facility] for recurrent right pleural effusion, symptomatic.  No malignancy noted.  Repeat CT scan showed a moderate-sized right pleural effusion, likely secondary to decompensated heart failure.  At his last visit, we noted an elevated BNP, and we initiated Entresto at a dose of 1/2 tablet twice daily.  We continued Farxiga and his other medications.    He feels much improved from previously.  He is not dyspneic.  He traveled to [place] without major complications.  He does not wrap his legs, as had been suggested.  No chest pain.  No significant dyspnea.    Specialty Problems          Cardiology Problems    (HFpEF) heart failure with preserved ejection fraction (CMS/[facility])     March 3, 2023: LVEF 40 to 45%, mild aortic stenosis.         Arteriosclerotic coronary artery disease    Atrial fibrillation (CMS/HCC)     2017 Perioperative         Hypertension    S/P CABG (coronary artery bypass graft)     1994.  Left CARIN to LAD, SVG to D1, SVG to dominant circumflex  PDA.         Hypercholesterolemia    Hypotension    Lung cancer (CMS/HCC)     Description: Squamous cell lung cancer stage IIIa 2019. Radiation therapy 6 weeks. Also 6 weekly chemo treatments.             Past Medical History:   Diagnosis Date    Atherosclerotic heart disease of native coronary artery without angina pectoris     Coronary heart disease    Encounter for screening for malignant neoplasm of colon 05/15/2018    Encounter for screening colonoscopy    GI bleed 02/20/2023    Malignant neoplasm of colon, unspecified (CMS/HCC)     Colon cancer    Old myocardial infarction 07/26/2013    History of acute myocardial infarction    Other conditions influencing health status 03/12/2018    Skin tear    Other specified disorders of ear, unspecified ear 11/08/2017    Blocked ear    Personal history of other diseases of the circulatory system     History of cardiac disorder    Personal history of other diseases of the musculoskeletal system and connective tissue     Personal history of arthritis    Personal history of other specified conditions     History of chest pain    Personal history of pneumonia (recurrent) 07/09/2019    History of pneumonia    Personal history of urinary (tract) infections     Personal history of urinary tract infection    Unspecified symptoms and signs involving the genitourinary system 06/14/2018    Urinary symptom or sign          Past Surgical History:   Procedure Laterality Date    CARDIAC SURGERY  01/13/2014    Heart Surgery    COLON SURGERY  01/13/2014    Colon Surgery    CORONARY ARTERY BYPASS GRAFT  12/09/2021    CABG    LUMBAR LAMINECTOMY  09/24/2020    Laminectomy Lumbar    OTHER SURGICAL HISTORY  11/06/2018    Cataract surgery            Social History     Tobacco Use    Smoking status: Former     Types: Cigarettes    Smokeless tobacco: Never   Vaping Use    Vaping Use: Never used   Substance Use Topics    Alcohol use: Yes     Alcohol/week: 3.0 standard drinks of alcohol     Types:  3 Standard drinks or equivalent per week    Drug use: Never        Physical Activity: Not on file            Allergies   Allergen Reactions    Penicillins Unknown     Rash as child    Adhesive Tape-Silicones Other     EKG pads pulled skin off         Current Outpatient Medications   Medication Instructions    albuterol 90 mcg/actuation inhaler 2 puffs, inhalation, Every 4 hours PRN    albuterol 2.5 mg, nebulization, 2 times daily    atorvastatin (Lipitor) 40 mg tablet 1 tablet, oral, Daily    cholecalciferol (VITAMIN D3) 4,000 Units, oral, Daily    coenzyme Q-10 200 mg capsule 2 capsules, oral, Daily    CRANBERRY ORAL 1 tablet, oral, Daily    dapagliflozin propanediol (FARXIGA) 5 mg, oral, Daily    Dulera 100-5 mcg/actuation inhaler INHALE TWO PUFFS BY MOUTH AS INSTRUCTED TWO TIMES A DAY    esomeprazole (NEXIUM) 40 mg, oral, Daily before breakfast    furosemide (LASIX) 40 mg, oral, Daily    guaiFENesin (MUCINEX) 600 mg, oral, Daily PRN, Do not crush, chew, or split.    ipratropium (Atrovent) 42 mcg (0.06 %) nasal spray INSTILL 2 SPRAYS INTO EACH NOSTRIL 4 TIMES A DAY AS NEEDED FOR RHINITIS    levothyroxine (Synthroid, Levoxyl) 75 mcg tablet 1 tablet, oral, Daily    metoprolol succinate XL (TOPROL-XL) 50 mg, oral, Daily    mometasone-formoterol (Dulera) 100-5 mcg/actuation inhaler 2 puffs, inhalation, Daily    multivitamin tablet 1 tablet, oral, Daily    OneTouch Delica Plus Lancet 30 gauge misc USE AS INSTRUCTED TO TEST BLOOD SUGAR TWICE DAILY    OneTouch Verio test strips strip use as instructed to test blood sugar twice a day    predniSONE (DELTASONE) 2 mg, oral, Daily    sacubitriL-valsartan (Entresto) 24-26 mg tablet 0.5 tablets, oral, 2 times daily    spironolactone (ALDACTONE) 12.5 mg, oral, Daily    triamcinolone (Kenalog) 0.1 % ointment 1 Application, Topical, 2 times daily PRN       ROS     Vital Signs:  Vitals:    12/04/23 0944   BP: 132/70   BP Location: Left arm   Patient Position: Sitting   Pulse: 101    Resp: 18   SpO2: 93%   Height: 1.829 m (6')     Wt Readings from Last 5 Encounters:   04/19/23 95.3 kg (210 lb)   07/22/21 86.2 kg (190 lb)   06/30/20 109 kg (241 lb)   06/23/20 109 kg (241 lb)     Body mass index is 28.48 kg/m².     Physical Exam:    Brief examination discloses that he was oriented and alert, sitting in a wheelchair without any complication.  Today, the lung fields were clear.  The heart sounds were regular, 100/min.  No murmurs appreciated.  1-2+ pretibial edema, as previously noted.  Otherwise, examination deferred.     Last Labs:  CMP:  Recent Labs     11/02/23  1136 10/06/23  1402 07/28/23  1034 07/13/23  1110 05/15/23  1020    138 139 139 140   K 5.1 4.7 5.1 4.5 5.1    102 101 99 101   CO2 27 25 27 28 31   ANIONGAP 14 16 16 17 13   BUN 35* 29* 30* 36* 32*   CREATININE 1.67* 1.71* 1.89* 2.32* 1.78*   EGFR 39* 38*  --   --   --    GLUCOSE 125* 127* 162* 128* 177*     Recent Labs     11/02/23  1136 10/06/23  1402 07/28/23  1034 05/15/23  1020 03/08/23  1455 02/10/23  1015 01/10/22  1146 12/27/21  1222 12/09/21  1010 07/22/21  1245 10/10/20  1527 06/08/18  0550 06/07/18  1839   ALBUMIN 3.6 3.7 3.8 3.6 4.0 3.7   < > 3.2* 3.6 3.6 2.9*   < > 3.7   ALKPHOS  --   --   --   --   --  60  --  162* 71 102 88   < > 47   ALT  --   --   --   --   --  14  --  36 19 16 16   < > 17   AST  --   --   --   --   --  13  --  28 14 17 21   < > 18   BILITOT  --   --   --   --   --  1.3*  --  1.0 0.7 0.7 1.5*   < > 2.7*   LIPASE  --   --   --   --   --   --   --   --   --   --   --   --  18    < > = values in this interval not displayed.     CBC:  Recent Labs     02/10/23  1015 01/10/22  1146 12/27/21  1222 12/09/21  1010 11/02/20  0445   WBC 10.1 8.2 14.6* 9.0 8.1   HGB 14.0 10.5* 10.5* 6.8* 10.3*   HCT 45.7 36.4* 34.6* 25.7* 36.4*    282 267 303 182    84 86 80 99     COAG:   Recent Labs     10/11/20  0650 10/10/20  1527 06/12/20  1120 07/10/19  0527 07/09/19  1534   INR 1.4* 1.9* 1.6* 2.2*  2.6*     HEME/ENDO:  Recent Labs     07/13/23  1110 03/18/23  0907 02/10/23  1015 11/22/22  1256 12/27/21  1222 12/09/21  1010 10/20/21  1445 07/22/21  1245 10/12/20  0553 11/06/18  0946   FERRITIN  --   --   --   --  852*  --   --   --  369*  --    IRONSAT  --   --   --   --  9*  --   --   --  8*  --    TSH  --   --  3.88  --   --  3.23  --  13.49*  --  1.37   HGBA1C 7.5* 9.9*  --  7.3*  --  6.1*   < >  --   --   --     < > = values in this interval not displayed.      CARDIAC:   Recent Labs     11/02/23  1136 10/06/23  1402 07/28/23  1034 07/13/23  1110 05/15/23  1020   * 451* 302* 212* 305*     Recent Labs     07/22/21  1245 11/06/18  0946   CHOL 127 139   LDLF  --  77   HDL  --  48.5   TRIG  --  69       Last Cardiology Tests:    ECG:    ECG today shows sinus tachycardia, left axis deviation right bundle branch block and anteroseptal infarction age undetermined.  Heart rate 101/min.    Echo: March 7, 2023   CONCLUSIONS:  1. Left ventricular systolic function is mildly to moderately decreased with a 40-45% estimated ejection fraction.  2. Spectral Doppler shows an abnormal pattern of left ventricular diastolic filling.  3. There are elevated left atrial and left ventricular end diastolic pressures.  4. There is moderately reduced right ventricular systolic function.  5. Mildly elevated RVSP.  6. Mild aortic valve stenosis.  7. There is mild concentric left ventricular hypertrophy.  8. There is global hypokinesis of the left ventricle with minor regional variations.  9. Compared with study from 12/30/2018, Rv dysfunction and dilatation is known. LV dysfunction and aortic stenosis is new.    Cath:      Stress Test:  Stress Results:  No results found for this or any previous visit from the past 365 days.         Cardiac Imaging:        Assessment/Plan     We reviewed the long problem list with the patient and with his wife, and reconciled his medications.  He is doing well with the addition of a low-dose of  Farxiga, and a low-dose of Entresto.  We think that Entresto can be titrated upward, and possibly the Farxiga as well.  We will increase Entresto 24-26 mg twice daily, and we will check another BNP and renal function panel in 2 weeks.  Will schedule a follow-up visit in 8 weeks.  Otherwise, no change.      Orders:  Orders Placed This Encounter   Procedures    ECG 12 lead (Clinic Performed)      Followup Appts:  No future appointments.        ____________________________________________________________  Rishabh Lewis MD    Senior Attending Physician  [facility]  [facility]    [facility]  [employer]

## 2023-12-21 LAB
ATRIAL RATE: 102 BPM
Q ONSET: 209 MS
QRS COUNT: 17 BEATS
QRS DURATION: 130 MS
QT INTERVAL: 380 MS
QTC CALCULATION(BAZETT): 492 MS
QTC FREDERICIA: 452 MS
R AXIS: -58 DEGREES
T AXIS: 88 DEGREES
T OFFSET: 399 MS
VENTRICULAR RATE: 101 BPM

## 2023-12-27 ENCOUNTER — TELEPHONE (OUTPATIENT)
Dept: CARDIOLOGY | Facility: CLINIC | Age: 88
End: 2023-12-27
Payer: COMMERCIAL

## 2023-12-27 NOTE — TELEPHONE ENCOUNTER
Reviewed with patient's wife.  Admitted with UTI, septic shock, possible pneumonia and acute on chronic kidney injury.  Went into atrial fibrillation.  Transferred to ICU, did not require pressors.  Resumed metoprolol, but so far, held Entresto, Farxiga, spironolactone.  Potassium has been corrected.  Would be best to perform JENNIFRE cardioversion prior to hospital discharge, after he has cleared his infection and has resumed beta-blocker and perhaps low-dose Entresto as well.  She can call back at any time to review.

## 2024-01-02 ENCOUNTER — TELEPHONE (OUTPATIENT)
Dept: SCHEDULING | Age: 89
End: 2024-01-02
Payer: COMMERCIAL

## 2024-01-02 ENCOUNTER — PATIENT OUTREACH (OUTPATIENT)
Dept: PRIMARY CARE | Facility: CLINIC | Age: 89
End: 2024-01-02
Payer: COMMERCIAL

## 2024-01-02 NOTE — PROGRESS NOTES
Discharge Facility:[facility]  Discharge Diagnosis:Hyperkalemia   Admission Date:12/24/23  Discharge Date: 12/30/23    PCP Appointment Date:1/3/24  Specialist Appointment Date: cardiology  Hospital Encounter and Summary: Linked       This patient has a follow up scheduled with PCP within 2 business days of DC on 1/3/24. This visit qualifies for TCM billingModerately complex & follow-up within 14 days- bill [id number] for hospital follow up. Highly complex and follow-up visit within 7 days-can bill [id number] for hospital follow up  ** Virtual follow up needs modifier added (95 or GT)** AWV AND TCM CAN BE BILLED TOGETHER WITH 25 MODIFIER  Luna Tim LPN

## 2024-01-02 NOTE — TELEPHONE ENCOUNTER
Was hospitalized with hyperkalemia, but other issues including septic shock.  Patient has been home, has follow-up with primary care tomorrow.  He is apparently in atrial flutter.  Did not do JENNIFER/cardioversion at [facility].  Will move up his February appointment, and will begin Farxiga 5 mg daily and a low-dose of Entresto 24-26 mg 1/2 tablet twice daily.  Do not resume spironolactone at this time.  Continue metoprolol.  Recommend repeat ECG when he is seen by primary care tomorrow.

## 2024-01-03 ENCOUNTER — OFFICE VISIT (OUTPATIENT)
Dept: PRIMARY CARE | Facility: CLINIC | Age: 89
End: 2024-01-03
Payer: MEDICARE

## 2024-01-03 VITALS
HEIGHT: 72 IN | HEART RATE: 114 BPM | SYSTOLIC BLOOD PRESSURE: 86 MMHG | BODY MASS INDEX: 26.41 KG/M2 | DIASTOLIC BLOOD PRESSURE: 58 MMHG | TEMPERATURE: 97.4 F | WEIGHT: 195 LBS

## 2024-01-03 DIAGNOSIS — N13.8 BPH WITH URINARY OBSTRUCTION: ICD-10-CM

## 2024-01-03 DIAGNOSIS — R60.9 RS3PE SYNDROME (REMITTING SERONEGATIVE SYMMETRICAL SYNOVITIS WITH PITTING EDEMA): ICD-10-CM

## 2024-01-03 DIAGNOSIS — M65.88 RS3PE SYNDROME (REMITTING SERONEGATIVE SYMMETRICAL SYNOVITIS WITH PITTING EDEMA): ICD-10-CM

## 2024-01-03 DIAGNOSIS — N40.1 BPH WITH URINARY OBSTRUCTION: ICD-10-CM

## 2024-01-03 DIAGNOSIS — I10 PRIMARY HYPERTENSION: ICD-10-CM

## 2024-01-03 DIAGNOSIS — I48.91 ATRIAL FIBRILLATION, UNSPECIFIED TYPE (MULTI): ICD-10-CM

## 2024-01-03 DIAGNOSIS — I50.32 CHRONIC HEART FAILURE WITH PRESERVED EJECTION FRACTION (MULTI): Primary | ICD-10-CM

## 2024-01-03 DIAGNOSIS — E87.5 HYPERKALEMIA: ICD-10-CM

## 2024-01-03 PROCEDURE — 1160F RVW MEDS BY RX/DR IN RCRD: CPT | Performed by: INTERNAL MEDICINE

## 2024-01-03 PROCEDURE — 1126F AMNT PAIN NOTED NONE PRSNT: CPT | Performed by: INTERNAL MEDICINE

## 2024-01-03 PROCEDURE — 93000 ELECTROCARDIOGRAM COMPLETE: CPT | Performed by: INTERNAL MEDICINE

## 2024-01-03 PROCEDURE — 99496 TRANSJ CARE MGMT HIGH F2F 7D: CPT | Performed by: INTERNAL MEDICINE

## 2024-01-03 PROCEDURE — 3078F DIAST BP <80 MM HG: CPT | Performed by: INTERNAL MEDICINE

## 2024-01-03 PROCEDURE — 1036F TOBACCO NON-USER: CPT | Performed by: INTERNAL MEDICINE

## 2024-01-03 PROCEDURE — 1159F MED LIST DOCD IN RCRD: CPT | Performed by: INTERNAL MEDICINE

## 2024-01-03 PROCEDURE — 3074F SYST BP LT 130 MM HG: CPT | Performed by: INTERNAL MEDICINE

## 2024-01-03 RX ORDER — PREDNISONE 1 MG/1
2 TABLET ORAL DAILY
Qty: 120 TABLET | Refills: 1 | Status: SHIPPED | OUTPATIENT
Start: 2024-01-03

## 2024-01-03 RX ORDER — TAMSULOSIN HYDROCHLORIDE 0.4 MG/1
0.4 CAPSULE ORAL
COMMUNITY
Start: 2023-12-31

## 2024-01-03 RX ORDER — IPRATROPIUM BROMIDE AND ALBUTEROL SULFATE 2.5; .5 MG/3ML; MG/3ML
3 SOLUTION RESPIRATORY (INHALATION) EVERY 6 HOURS PRN
COMMUNITY
Start: 2023-12-30

## 2024-01-03 ASSESSMENT — ENCOUNTER SYMPTOMS
COUGH: 0
FEVER: 0
CHILLS: 0
SHORTNESS OF BREATH: 0
POLYDIPSIA: 0
PALPITATIONS: 0

## 2024-01-03 NOTE — TELEPHONE ENCOUNTER
"Thank you for the heads up. I confirmed that the patient was following the correct medication plan, he is. Hospital DC paper work had furosemide \"as needed\" but they never defined that for them on DC and they haven't been using it. Mild increased leg swelling in 4 days out of hospital now. I wrote out a weight based prn furosemide plan for them and home to start, ordered a BMP for a week out. EKG had chronic RBBB, looked to be sinus tach today at 105. "

## 2024-01-03 NOTE — PATIENT INSTRUCTIONS
Ok to resume Furosemide 40mg with the following instructions.   Take a dose of furosemide today when you return home.  Starting tomorrow morning, check your weight and write it down.  That weight will be what is known as our dry weight and that will be our goal weight we will measure against.  Do not take furosemide tomorrow.  Each day moving forward after this in the morning you will check your weight and if it is 3 pounds or more higher than your first weight which was your dry weight that we a day you take your furosemide.  If your weight is within 2 pounds of your dry weight this is a day you do not take furosemide.    This will help us control water buildup, without over stressing your kidneys again risking kidney injury    Recheck a kidney blood test in 1 week please.     You can try the flomax from the hospital discharge once available at the pharmacy. Be mindful of possible dizziness, and if it develops, stop the flomax,

## 2024-01-07 NOTE — PROGRESS NOTES
Primary Care Physician: Joel Viramotnes MD  Date of Visit: 01/08/2024  9:40 AM EST  Location of visit: [facility] [place] [address]   Last office visit: 12/4/2023     Chief Complaint:     Atrial fibrillation.  Hospital follow-up.    HPI/Summary  Arnaldo Neal is a 88 y.o. male who presents for followup cardiology evaluation.     The patient presents after a recent hospitalization.  He was just in the office 1 month ago.  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  An echocardiogram on March 3, 2023 showed mild LV dysfunction with mildly elevated RVSP, mild aortic stenosis and mild global hypokinesis.  Also, mild RV dysfunction and RV dilatation.  In September, 2023, he underwent right thoracentesis at [facility] for recurrent right pleural effusion.  No malignancy noted.  Repeat CT scan showed a moderate-sized right pleural effusion, likely secondary to decompensated heart failure.  In November, we initiated treatment with low-dose Entresto.    The patient was hospitalized at [place] from 12/24/2023 through 12/29/2023.  He had presented with UTI, complicated by septic shock, possible pneumonia and acute on chronic kidney injury.  He went into atrial fibrillation, was transferred to the ICU, did not require pressors.  Metoprolol was resumed.  Hyperkalemia was corrected.  He was discharged in atrial flutter.  We reviewed his medications and decided to resume Farxiga 5 mg daily and low-dose Entresto, 24-26 mg 1/2 tablet twice daily.  We did not resume spironolactone.  We continued metoprolol.  An ECG performed by his primary care physician a few days ago showed a  wide-complex rhythm at 105/min.  Unclear whether this represented an atrial tachycardia or not.    He has gained about 7 pounds this past week.  Still somewhat dyspneic.  No fevers or chills.  His wife has been monitoring his medications very carefully.  Blood pressures at home are around 100/60.  Heart rates have come down from greater than 100 to around 90/min.    Specialty Problems          Cardiology Problems    (HFpEF) heart failure with preserved ejection fraction (CMS/[facility])     March 3, 2023: LVEF 40 to 45%, mild aortic stenosis.         Arteriosclerotic coronary artery disease    Atrial fibrillation (CMS/[facility])     2017 Perioperative         Hypertension    S/P CABG (coronary artery bypass graft)     1994.  Left CARIN to LAD, SVG to D1, SVG to dominant circumflex PDA.         Hypercholesterolemia    Hypotension    Lung cancer (CMS/[facility])     Description: Squamous cell lung cancer stage IIIa 2019. Radiation therapy 6 weeks. Also 6 weekly chemo treatments.             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    History of acute myocardial infarction    Other conditions influencing health status 03/12/2018    Skin tear    Other specified disorders of ear, unspecified ear 11/08/2017    Blocked ear    Personal history of other diseases of the circulatory system     History of cardiac disorder    Personal history of other diseases of the musculoskeletal system and connective tissue     Personal history of arthritis    Personal history of other specified conditions     History of chest pain    Personal history of pneumonia (recurrent) 07/09/2019    History of pneumonia     Personal history of urinary (tract) infections     Personal history of urinary tract infection    Unspecified symptoms and signs involving the genitourinary system 06/14/2018    Urinary symptom or sign          Past Surgical History:   Procedure Laterality Date    CARDIAC SURGERY  01/13/2014    Heart Surgery    COLON SURGERY  01/13/2014    Colon Surgery    CORONARY ARTERY BYPASS GRAFT  12/09/2021    CABG    LUMBAR LAMINECTOMY  09/24/2020    Laminectomy Lumbar    OTHER SURGICAL HISTORY  11/06/2018    Cataract surgery            Social History     Tobacco Use    Smoking status: Former     Types: Cigarettes    Smokeless tobacco: Never   Vaping Use    Vaping Use: Never used   Substance Use Topics    Alcohol use: Yes     Alcohol/week: 3.0 standard drinks of alcohol     Types: 3 Standard drinks or equivalent per week    Drug use: Never        Physical Activity: Not on file            Allergies   Allergen Reactions    Penicillins Unknown     Rash as child    Adhesive Tape-Silicones Other     EKG pads pulled skin off         Current Outpatient Medications   Medication Instructions    atorvastatin (Lipitor) 40 mg tablet 1 tablet, oral, Daily    cholecalciferol (VITAMIN D3) 4,000 Units, oral, Daily    coenzyme Q-10 200 mg capsule 2 capsules, oral, Daily    dapagliflozin propanediol (FARXIGA) 5 mg, oral, Daily    Dulera 100-5 mcg/actuation inhaler INHALE TWO PUFFS BY MOUTH AS INSTRUCTED TWO TIMES A DAY    esomeprazole (NEXIUM) 40 mg, oral, Daily before breakfast    furosemide (LASIX) 40 mg, oral, Daily PRN    ipratropium (Atrovent) 42 mcg (0.06 %) nasal spray INSTILL 2 SPRAYS INTO EACH NOSTRIL 4 TIMES A DAY AS NEEDED FOR RHINITIS    ipratropium-albuteroL (Duo-Neb) 0.5-2.5 mg/3 mL nebulizer solution 3 mL, inhalation, Every 6 hours PRN    levothyroxine (Synthroid, Levoxyl) 75 mcg tablet 1 tablet, oral, Daily    metoprolol succinate XL (TOPROL-XL) 50 mg, oral, Daily    mometasone-formoterol (Dulera) 100-5 mcg/actuation inhaler 2  puffs, inhalation, Daily    OneTouch Delica Plus Lancet 30 gauge misc USE AS INSTRUCTED TO TEST BLOOD SUGAR TWICE DAILY    OneTouch Verio test strips strip use as instructed to test blood sugar twice a day    predniSONE (DELTASONE) 2 mg, oral, Daily    sacubitriL-valsartan (Entresto) 24-26 mg tablet 0.5 tablets, oral, 2 times daily    tamsulosin (FLOMAX) 0.4 mg, oral, Daily RT       ROS     Vital Signs:  Vitals:    01/08/24 1009   BP: 90/61   BP Location: Right arm   Patient Position: Sitting   BP Cuff Size: Large adult   Pulse: 92   SpO2: 92%     Wt Readings from Last 5 Encounters:   01/03/24 88.5 kg (195 lb)   04/19/23 95.3 kg (210 lb)   07/22/21 86.2 kg (190 lb)   06/30/20 109 kg (241 lb)   06/23/20 109 kg (241 lb)     There is no height or weight on file to calculate BMI.     Physical Exam:    He was alert, responded to questions but was sluggish.  There were rales at both bases.  The heart sounds were distant but regular.  The abdomen was obese.  There was 1-2+ pretibial edema bilateral.  Otherwise, examination not performed.     Last Labs:  CMP:  Recent Labs     11/02/23  1136 10/06/23  1402 07/28/23  1034 07/13/23  1110 05/15/23  1020    138 139 139 140   K 5.1 4.7 5.1 4.5 5.1    102 101 99 101   CO2 27 25 27 28 31   ANIONGAP 14 16 16 17 13   BUN 35* 29* 30* 36* 32*   CREATININE 1.67* 1.71* 1.89* 2.32* 1.78*   EGFR 39* 38*  --   --   --    GLUCOSE 125* 127* 162* 128* 177*     Recent Labs     11/02/23  1136 10/06/23  1402 07/28/23  1034 05/15/23  1020 03/08/23  1455 02/10/23  1015 01/10/22  1146 12/27/21  1222 12/09/21  1010 07/22/21  1245 10/10/20  1527 06/08/18  0550 06/07/18  1839   ALBUMIN 3.6 3.7 3.8 3.6 4.0 3.7   < > 3.2* 3.6 3.6 2.9*   < > 3.7   ALKPHOS  --   --   --   --   --  60  --  162* 71 102 88   < > 47   ALT  --   --   --   --   --  14  --  36 19 16 16   < > 17   AST  --   --   --   --   --  13  --  28 14 17 21   < > 18   BILITOT  --   --   --   --   --  1.3*  --  1.0 0.7 0.7 1.5*   <  > 2.7*   LIPASE  --   --   --   --   --   --   --   --   --   --   --   --  18    < > = values in this interval not displayed.     CBC:  Recent Labs     02/10/23  1015 01/10/22  1146 12/27/21  1222 12/09/21  1010 11/02/20  0445   WBC 10.1 8.2 14.6* 9.0 8.1   HGB 14.0 10.5* 10.5* 6.8* 10.3*   HCT 45.7 36.4* 34.6* 25.7* 36.4*    282 267 303 182    84 86 80 99     COAG:   Recent Labs     10/11/20  0650 10/10/20  1527 06/12/20  1120 07/10/19  0527 07/09/19  1534   INR 1.4* 1.9* 1.6* 2.2* 2.6*     HEME/ENDO:  Recent Labs     07/13/23  1110 03/18/23  0907 02/10/23  1015 11/22/22  1256 12/27/21  1222 12/09/21  1010 10/20/21  1445 07/22/21  1245 10/12/20  0553 11/06/18  0946   FERRITIN  --   --   --   --  852*  --   --   --  369*  --    IRONSAT  --   --   --   --  9*  --   --   --  8*  --    TSH  --   --  3.88  --   --  3.23  --  13.49*  --  1.37   HGBA1C 7.5* 9.9*  --  7.3*  --  6.1*   < >  --   --   --     < > = values in this interval not displayed.      CARDIAC:   Recent Labs     11/02/23  1136 10/06/23  1402 07/28/23  1034 07/13/23  1110 05/15/23  1020   * 451* 302* 212* 305*     Recent Labs     07/22/21  1245 11/06/18  0946   CHOL 127 139   LDLF  --  77   HDL  --  48.5   TRIG  --  69   Basic metabolic panel performed at [place] on December 28, 2023 showed a creatinine of 1.29, potassium had normalized at 3.9.    Last Cardiology Tests:    ECG:    Tracing today is technically difficult.  Suspect sinus rhythm with left bundle branch block, heart rate 93/min.    Echo:  Echo Results:  No results found for this or any previous visit from the past 3650 days.       Cath:      Stress Test:  Stress Results:  No results found for this or any previous visit from the past 365 days.         Cardiac Imaging:        Assessment/Plan     Complex patient with extensive coronary artery disease, heart failure with mildly reduced LV systolic function, recurrent UTI, RS3PE syndrome treated with prednisone, mild RV  dysfunction and a recent hospitalization for UTI and septic shock with transient acute kidney injury.  We have resumed low doses of Entresto and Farxiga and have maintained metoprolol succinate.  Spironolactone has been held after the presentation with hyperkalemia, but can probably be resumed.  First, we will have him resume furosemide 40 mg every day since he does have some volume overload on examination.  He has scheduled visits with pulmonary medicine  And with urology.  Primary care has ordered a basic metabolic panel which he will obtain next week.  After we review that study we will call him i and decide on the timing of a follow-up in person visit.    Orders:  No orders of the defined types were placed in this encounter.     Followup Appts:  No future appointments.          ____________________________________________________________  Rishabh Lewis MD    Senior Attending Physician  [facility] [place]  [facility]    [facility] Chair for Cardiovascular Excellence  [employer]

## 2024-01-08 ENCOUNTER — OFFICE VISIT (OUTPATIENT)
Dept: CARDIOLOGY | Facility: CLINIC | Age: 89
End: 2024-01-08
Payer: MEDICARE

## 2024-01-08 ENCOUNTER — APPOINTMENT (OUTPATIENT)
Dept: CARDIOLOGY | Facility: CLINIC | Age: 89
End: 2024-01-08
Payer: COMMERCIAL

## 2024-01-08 VITALS — DIASTOLIC BLOOD PRESSURE: 61 MMHG | SYSTOLIC BLOOD PRESSURE: 90 MMHG | OXYGEN SATURATION: 92 % | HEART RATE: 92 BPM

## 2024-01-08 DIAGNOSIS — I48.91 ATRIAL FIBRILLATION, UNSPECIFIED TYPE (MULTI): Primary | ICD-10-CM

## 2024-01-08 DIAGNOSIS — Z00.00 HEALTHCARE MAINTENANCE: ICD-10-CM

## 2024-01-08 DIAGNOSIS — I50.32 CHRONIC HEART FAILURE WITH PRESERVED EJECTION FRACTION (MULTI): ICD-10-CM

## 2024-01-08 PROCEDURE — 3078F DIAST BP <80 MM HG: CPT | Performed by: INTERNAL MEDICINE

## 2024-01-08 PROCEDURE — 93005 ELECTROCARDIOGRAM TRACING: CPT | Performed by: INTERNAL MEDICINE

## 2024-01-08 PROCEDURE — 1036F TOBACCO NON-USER: CPT | Performed by: INTERNAL MEDICINE

## 2024-01-08 PROCEDURE — 99215 OFFICE O/P EST HI 40 MIN: CPT | Performed by: INTERNAL MEDICINE

## 2024-01-08 PROCEDURE — 1126F AMNT PAIN NOTED NONE PRSNT: CPT | Performed by: INTERNAL MEDICINE

## 2024-01-08 PROCEDURE — 99215 OFFICE O/P EST HI 40 MIN: CPT | Mod: 25 | Performed by: INTERNAL MEDICINE

## 2024-01-08 PROCEDURE — 1160F RVW MEDS BY RX/DR IN RCRD: CPT | Performed by: INTERNAL MEDICINE

## 2024-01-08 PROCEDURE — 93010 ELECTROCARDIOGRAM REPORT: CPT | Performed by: INTERNAL MEDICINE

## 2024-01-08 PROCEDURE — 1159F MED LIST DOCD IN RCRD: CPT | Performed by: INTERNAL MEDICINE

## 2024-01-08 PROCEDURE — 3074F SYST BP LT 130 MM HG: CPT | Performed by: INTERNAL MEDICINE

## 2024-01-08 RX ORDER — FUROSEMIDE 40 MG/1
40 TABLET ORAL DAILY PRN
Qty: 30 TABLET | Refills: 2 | Status: SHIPPED | OUTPATIENT
Start: 2024-01-08

## 2024-01-08 RX ORDER — SACUBITRIL AND VALSARTAN 24; 26 MG/1; MG/1
0.5 TABLET, FILM COATED ORAL 2 TIMES DAILY
Qty: 90 TABLET | Refills: 3 | Status: SHIPPED | OUTPATIENT
Start: 2024-01-08

## 2024-01-08 ASSESSMENT — ENCOUNTER SYMPTOMS
OCCASIONAL FEELINGS OF UNSTEADINESS: 0
LOSS OF SENSATION IN FEET: 0
DEPRESSION: 0

## 2024-01-08 ASSESSMENT — COLUMBIA-SUICIDE SEVERITY RATING SCALE - C-SSRS
6. HAVE YOU EVER DONE ANYTHING, STARTED TO DO ANYTHING, OR PREPARED TO DO ANYTHING TO END YOUR LIFE?: NO
2. HAVE YOU ACTUALLY HAD ANY THOUGHTS OF KILLING YOURSELF?: NO
1. IN THE PAST MONTH, HAVE YOU WISHED YOU WERE DEAD OR WISHED YOU COULD GO TO SLEEP AND NOT WAKE UP?: NO

## 2024-01-17 ENCOUNTER — TELEPHONE (OUTPATIENT)
Dept: SCHEDULING | Age: 89
End: 2024-01-17
Payer: COMMERCIAL

## 2024-01-17 NOTE — TELEPHONE ENCOUNTER
He is admitted to [facility], after he developed hypotension following a thoracentesis last week.  900 cc of fluid was drained.  Details unavailable at this time.  Patient is improving, and we spoke with his wife this afternoon.

## 2024-01-22 ENCOUNTER — TELEPHONE (OUTPATIENT)
Dept: SCHEDULING | Age: 89
End: 2024-01-22

## 2024-01-24 LAB
ATRIAL RATE: 94 BPM
P AXIS: 99 DEGREES
P OFFSET: 161 MS
P ONSET: 116 MS
Q ONSET: 201 MS
QRS COUNT: 15 BEATS
QRS DURATION: 146 MS
QT INTERVAL: 404 MS
QTC CALCULATION(BAZETT): 502 MS
QTC FREDERICIA: 467 MS
R AXIS: -47 DEGREES
T AXIS: 212 DEGREES
T OFFSET: 403 MS
VENTRICULAR RATE: 93 BPM

## 2024-02-05 ENCOUNTER — APPOINTMENT (OUTPATIENT)
Dept: CARDIOLOGY | Facility: CLINIC | Age: 89
End: 2024-02-05
Payer: MEDICARE

## 2024-02-06 ENCOUNTER — PATIENT OUTREACH (OUTPATIENT)
Dept: PRIMARY CARE | Facility: CLINIC | Age: 89
End: 2024-02-06
Payer: COMMERCIAL

## 2024-02-06 ENCOUNTER — TELEPHONE (OUTPATIENT)
Dept: SCHEDULING | Age: 89
End: 2024-02-06

## 2024-02-06 NOTE — PROGRESS NOTES
Discharge Facility:[facility]  Discharge Diagnosis: Hypotension, SOB and lightheadedness  Admission Date:1/15/24  Discharge Date: 2/3/24    PCP Appointment Date:2/16/24  Specialist Appointment Date: Endocrinology  Hospital Encounter and Summary: Linked   See discharge assessment below for further details  Engagement  Call Start Time: 1025 (2/6/2024 10:25 AM)    Medications  Medications reviewed with patient/caregiver?: Yes (2/6/2024 10:25 AM)  Is the patient having any side effects they believe may be caused by any medication additions or changes?: No (2/6/2024 10:25 AM)  Does the patient have all medications ordered at discharge?: Yes (2/6/2024 10:25 AM)  Prescription Comments: - START Hydrocortisone orally 20 mg at 8:00 am and 10 mg at  2:00 pm.  - START Fludrocortisone 0.1 mg daily  - START insulin glargine 8 units in the morning, self-adjust  based on glucose readings as instructed  - START Amiodarone - 400mg twice a day till Feb 12, then  200mg once a day- START protonix once daily before breakfast (2/6/2024 10:25 AM)  Is the patient taking all medications as directed (includes completed medication regime)?: Yes (2/6/2024 10:25 AM)  Medication Comments: see med list (2/6/2024 10:25 AM)    Appointments  Does the patient have a primary care provider?: Yes (2/6/2024 10:25 AM)  Care Management Interventions: Verified appointment date/time/provider ([facility] 2/16/24) (2/6/2024 10:25 AM)  Has the patient kept scheduled appointments due by today?: Yes (2/6/2024 10:25 AM)  Care Management Interventions: Advised patient to keep appointment; Advised to schedule with specialist (Endocrinology) (2/6/2024 10:25 AM)    Patient Teaching  Does the patient have access to their discharge instructions?: Yes (2/6/2024 10:25 AM)  Care Management Interventions: Reviewed instructions with patient (2/6/2024 10:25 AM)  What is the patient's perception of their health status since discharge?: Improving (2/6/2024 10:25 AM)  Is the  patient/caregiver able to teach back the hierarchy of who to call/visit for symptoms/problems? PCP, Specialist, Home Health nurse, Urgent Care, ED, 911: Yes (2/6/2024 10:25 AM)      Luna Tim LPN

## 2024-02-07 ENCOUNTER — APPOINTMENT (OUTPATIENT)
Dept: CARDIOLOGY | Facility: CLINIC | Age: 89
End: 2024-02-07
Payer: MEDICARE

## 2024-02-16 ENCOUNTER — APPOINTMENT (OUTPATIENT)
Dept: PRIMARY CARE | Facility: CLINIC | Age: 89
End: 2024-02-16
Payer: COMMERCIAL

## 2024-02-20 ENCOUNTER — APPOINTMENT (OUTPATIENT)
Dept: PRIMARY CARE | Facility: CLINIC | Age: 89
End: 2024-02-20
Payer: MEDICARE